# Patient Record
Sex: MALE | Race: BLACK OR AFRICAN AMERICAN | Employment: UNEMPLOYED | URBAN - METROPOLITAN AREA
[De-identification: names, ages, dates, MRNs, and addresses within clinical notes are randomized per-mention and may not be internally consistent; named-entity substitution may affect disease eponyms.]

---

## 2018-08-14 ENCOUNTER — APPOINTMENT (OUTPATIENT)
Dept: GENERAL RADIOLOGY | Facility: HOSPITAL | Age: 38
DRG: 638 | End: 2018-08-14
Attending: EMERGENCY MEDICINE
Payer: COMMERCIAL

## 2018-08-14 ENCOUNTER — HOSPITAL ENCOUNTER (INPATIENT)
Facility: HOSPITAL | Age: 38
LOS: 1 days | Discharge: HOME | DRG: 638 | End: 2018-08-15
Attending: EMERGENCY MEDICINE | Admitting: HOSPITALIST
Payer: COMMERCIAL

## 2018-08-14 DIAGNOSIS — E11.10 DIABETIC KETOACIDOSIS WITHOUT COMA ASSOCIATED WITH TYPE 2 DIABETES MELLITUS (HCC): Primary | ICD-10-CM

## 2018-08-14 LAB
ANION GAP SERPL CALC-SCNC: 20 MMOL/L (ref 0–18)
BACTERIA UR QL AUTO: NEGATIVE /HPF
BASOPHILS # BLD: 0 K/UL (ref 0–0.2)
BASOPHILS NFR BLD: 0 %
BILIRUB UR QL: NEGATIVE
BUN SERPL-MCNC: 18 MG/DL (ref 8–20)
BUN/CREAT SERPL: 13.5 (ref 10–20)
CALCIUM SERPL-MCNC: 9 MG/DL (ref 8.5–10.5)
CHLORIDE SERPL-SCNC: 89 MMOL/L (ref 95–110)
CLARITY UR: CLEAR
CO2 SERPL-SCNC: 14 MMOL/L (ref 22–32)
COLOR UR: COLORLESS
CREAT SERPL-MCNC: 1.33 MG/DL (ref 0.5–1.5)
EOSINOPHIL # BLD: 0.1 K/UL (ref 0–0.7)
EOSINOPHIL NFR BLD: 1 %
ERYTHROCYTE [DISTWIDTH] IN BLOOD BY AUTOMATED COUNT: 14.7 % (ref 11–15)
GLUCOSE BLDC GLUCOMTR-MCNC: 263 MG/DL (ref 70–99)
GLUCOSE BLDC GLUCOMTR-MCNC: 333 MG/DL (ref 70–99)
GLUCOSE BLDC GLUCOMTR-MCNC: 415 MG/DL (ref 70–99)
GLUCOSE BLDC GLUCOMTR-MCNC: >500 MG/DL (ref 70–99)
GLUCOSE SERPL-MCNC: 711 MG/DL (ref 70–99)
GLUCOSE UR-MCNC: >=500 MG/DL
HCT VFR BLD AUTO: 42.9 % (ref 41–52)
HGB BLD-MCNC: 14.3 G/DL (ref 13.5–17.5)
HGB UR QL STRIP.AUTO: NEGATIVE
KETONES UR-MCNC: 20 MG/DL
LEUKOCYTE ESTERASE UR QL STRIP.AUTO: NEGATIVE
LYMPHOCYTES # BLD: 1.1 K/UL (ref 1–4)
LYMPHOCYTES NFR BLD: 14 %
MCH RBC QN AUTO: 27.2 PG (ref 27–32)
MCHC RBC AUTO-ENTMCNC: 33.3 G/DL (ref 32–37)
MCV RBC AUTO: 81.7 FL (ref 80–100)
MONOCYTES # BLD: 0.7 K/UL (ref 0–1)
MONOCYTES NFR BLD: 9 %
NEUTROPHILS # BLD AUTO: 6.3 K/UL (ref 1.8–7.7)
NEUTROPHILS NFR BLD: 77 %
NITRITE UR QL STRIP.AUTO: NEGATIVE
OSMOLALITY UR CALC.SUM OF ELEC: 292 MOSM/KG (ref 275–295)
PH UR: 5 [PH] (ref 5–8)
PLATELET # BLD AUTO: 244 K/UL (ref 140–400)
PMV BLD AUTO: 9.3 FL (ref 7.4–10.3)
POTASSIUM SERPL-SCNC: 4.3 MMOL/L (ref 3.3–5.1)
PROT UR-MCNC: NEGATIVE MG/DL
RBC # BLD AUTO: 5.25 M/UL (ref 4.5–5.9)
RBC #/AREA URNS AUTO: 0 /HPF
SODIUM SERPL-SCNC: 123 MMOL/L (ref 136–144)
SP GR UR STRIP: 1.03 (ref 1–1.03)
TROPONIN I SERPL-MCNC: 0.01 NG/ML (ref ?–0.03)
UROBILINOGEN UR STRIP-ACNC: <2
VIT C UR-MCNC: NEGATIVE MG/DL
WBC # BLD AUTO: 8.3 K/UL (ref 4–11)
WBC #/AREA URNS AUTO: 2 /HPF

## 2018-08-14 PROCEDURE — 71045 X-RAY EXAM CHEST 1 VIEW: CPT | Performed by: EMERGENCY MEDICINE

## 2018-08-14 PROCEDURE — 99223 1ST HOSP IP/OBS HIGH 75: CPT | Performed by: HOSPITALIST

## 2018-08-14 NOTE — ED INITIAL ASSESSMENT (HPI)
Mid-sternum chest pain that started yesterday and has resolved. Pt also reports BS greater than 500 per pt. Pt states he is not a diabetic, but mom just wanted to check it because she has a hx of DM. Pt has no complaints at this time.

## 2018-08-15 VITALS
HEART RATE: 65 BPM | WEIGHT: 268.63 LBS | BODY MASS INDEX: 33.4 KG/M2 | OXYGEN SATURATION: 98 % | SYSTOLIC BLOOD PRESSURE: 145 MMHG | TEMPERATURE: 98 F | HEIGHT: 75 IN | DIASTOLIC BLOOD PRESSURE: 98 MMHG | RESPIRATION RATE: 18 BRPM

## 2018-08-15 LAB
ANION GAP SERPL CALC-SCNC: 10 MMOL/L (ref 0–18)
ANION GAP SERPL CALC-SCNC: 13 MMOL/L (ref 0–18)
BASOPHILS # BLD: 0 K/UL (ref 0–0.2)
BASOPHILS NFR BLD: 0 %
BUN SERPL-MCNC: 14 MG/DL (ref 8–20)
BUN SERPL-MCNC: 15 MG/DL (ref 8–20)
BUN/CREAT SERPL: 13.7 (ref 10–20)
BUN/CREAT SERPL: 14 (ref 10–20)
CALCIUM SERPL-MCNC: 8.4 MG/DL (ref 8.5–10.5)
CALCIUM SERPL-MCNC: 8.8 MG/DL (ref 8.5–10.5)
CHLORIDE SERPL-SCNC: 100 MMOL/L (ref 95–110)
CHLORIDE SERPL-SCNC: 100 MMOL/L (ref 95–110)
CO2 SERPL-SCNC: 21 MMOL/L (ref 22–32)
CO2 SERPL-SCNC: 24 MMOL/L (ref 22–32)
CREAT SERPL-MCNC: 1.02 MG/DL (ref 0.5–1.5)
CREAT SERPL-MCNC: 1.07 MG/DL (ref 0.5–1.5)
EOSINOPHIL # BLD: 0.1 K/UL (ref 0–0.7)
EOSINOPHIL NFR BLD: 2 %
ERYTHROCYTE [DISTWIDTH] IN BLOOD BY AUTOMATED COUNT: 14.2 % (ref 11–15)
GLUCOSE BLDC GLUCOMTR-MCNC: 210 MG/DL (ref 70–99)
GLUCOSE BLDC GLUCOMTR-MCNC: 237 MG/DL (ref 70–99)
GLUCOSE BLDC GLUCOMTR-MCNC: 240 MG/DL (ref 70–99)
GLUCOSE BLDC GLUCOMTR-MCNC: 243 MG/DL (ref 70–99)
GLUCOSE BLDC GLUCOMTR-MCNC: 265 MG/DL (ref 70–99)
GLUCOSE BLDC GLUCOMTR-MCNC: 287 MG/DL (ref 70–99)
GLUCOSE BLDC GLUCOMTR-MCNC: 346 MG/DL (ref 70–99)
GLUCOSE SERPL-MCNC: 207 MG/DL (ref 70–99)
GLUCOSE SERPL-MCNC: 352 MG/DL (ref 70–99)
HBA1C MFR BLD: >16.9 % (ref 4–6)
HCT VFR BLD AUTO: 39.9 % (ref 41–52)
HGB BLD-MCNC: 13.3 G/DL (ref 13.5–17.5)
LYMPHOCYTES # BLD: 1.3 K/UL (ref 1–4)
LYMPHOCYTES NFR BLD: 20 %
MCH RBC QN AUTO: 27 PG (ref 27–32)
MCHC RBC AUTO-ENTMCNC: 33.4 G/DL (ref 32–37)
MCV RBC AUTO: 80.8 FL (ref 80–100)
MONOCYTES # BLD: 0.7 K/UL (ref 0–1)
MONOCYTES NFR BLD: 10 %
MRSA DNA SPEC QL NAA+PROBE: NEGATIVE
NEUTROPHILS # BLD AUTO: 4.4 K/UL (ref 1.8–7.7)
NEUTROPHILS NFR BLD: 68 %
OSMOLALITY UR CALC.SUM OF ELEC: 285 MOSM/KG (ref 275–295)
OSMOLALITY UR CALC.SUM OF ELEC: 293 MOSM/KG (ref 275–295)
PLATELET # BLD AUTO: 213 K/UL (ref 140–400)
PMV BLD AUTO: 9.2 FL (ref 7.4–10.3)
POTASSIUM SERPL-SCNC: 3.1 MMOL/L (ref 3.3–5.1)
POTASSIUM SERPL-SCNC: 3.9 MMOL/L (ref 3.3–5.1)
RBC # BLD AUTO: 4.94 M/UL (ref 4.5–5.9)
SODIUM SERPL-SCNC: 134 MMOL/L (ref 136–144)
SODIUM SERPL-SCNC: 134 MMOL/L (ref 136–144)
WBC # BLD AUTO: 6.5 K/UL (ref 4–11)

## 2018-08-15 PROCEDURE — 99239 HOSP IP/OBS DSCHRG MGMT >30: CPT | Performed by: HOSPITALIST

## 2018-08-15 PROCEDURE — 99254 IP/OBS CNSLTJ NEW/EST MOD 60: CPT | Performed by: INTERNAL MEDICINE

## 2018-08-15 RX ORDER — HYDRALAZINE HYDROCHLORIDE 20 MG/ML
10 INJECTION INTRAMUSCULAR; INTRAVENOUS EVERY 4 HOURS PRN
Status: DISCONTINUED | OUTPATIENT
Start: 2018-08-15 | End: 2018-08-15

## 2018-08-15 RX ORDER — METFORMIN HYDROCHLORIDE 500 MG/1
500 TABLET, EXTENDED RELEASE ORAL 2 TIMES DAILY WITH MEALS
Qty: 60 TABLET | Refills: 2 | Status: SHIPPED | OUTPATIENT
Start: 2018-08-15 | End: 2020-10-13

## 2018-08-15 RX ORDER — GLIMEPIRIDE 4 MG/1
2 TABLET ORAL
Qty: 30 TABLET | Refills: 2 | Status: SHIPPED | OUTPATIENT
Start: 2018-08-15 | End: 2020-05-28

## 2018-08-15 RX ORDER — METOCLOPRAMIDE HYDROCHLORIDE 5 MG/ML
10 INJECTION INTRAMUSCULAR; INTRAVENOUS EVERY 6 HOURS PRN
Status: DISCONTINUED | OUTPATIENT
Start: 2018-08-15 | End: 2018-08-15

## 2018-08-15 RX ORDER — ACETAMINOPHEN 325 MG/1
650 TABLET ORAL EVERY 6 HOURS PRN
Status: DISCONTINUED | OUTPATIENT
Start: 2018-08-15 | End: 2018-08-15

## 2018-08-15 RX ORDER — PANTOPRAZOLE SODIUM 40 MG/1
40 TABLET, DELAYED RELEASE ORAL
Status: DISCONTINUED | OUTPATIENT
Start: 2018-08-15 | End: 2018-08-15

## 2018-08-15 RX ORDER — ENOXAPARIN SODIUM 100 MG/ML
40 INJECTION SUBCUTANEOUS DAILY
Status: DISCONTINUED | OUTPATIENT
Start: 2018-08-15 | End: 2018-08-15

## 2018-08-15 RX ORDER — 0.9 % SODIUM CHLORIDE 0.9 %
1000 INTRAVENOUS SOLUTION INTRAVENOUS ONCE
Status: COMPLETED | OUTPATIENT
Start: 2018-08-15 | End: 2018-08-15

## 2018-08-15 RX ORDER — DEXTROSE MONOHYDRATE 25 G/50ML
50 INJECTION, SOLUTION INTRAVENOUS AS NEEDED
Status: DISCONTINUED | OUTPATIENT
Start: 2018-08-15 | End: 2018-08-15

## 2018-08-15 RX ORDER — ONDANSETRON 2 MG/ML
4 INJECTION INTRAMUSCULAR; INTRAVENOUS EVERY 6 HOURS PRN
Status: DISCONTINUED | OUTPATIENT
Start: 2018-08-15 | End: 2018-08-15

## 2018-08-15 RX ORDER — SODIUM CHLORIDE AND POTASSIUM CHLORIDE .9; .15 G/100ML; G/100ML
SOLUTION INTRAVENOUS CONTINUOUS
Status: DISCONTINUED | OUTPATIENT
Start: 2018-08-15 | End: 2018-08-15

## 2018-08-15 NOTE — ED NOTES
Housekeeping contacted to provide patient w/ inpt bed for CCU hold in ER. Patient provided w/ additional water. He denies any complaints at this time. Resting comfortably in bed on monitor. Will continue to monitor.

## 2018-08-15 NOTE — H&P
[address] Patient Status:  Emergency    1980 MRN [id number]   Location [address] Attending Rob Hull MD   Hosp Day # 0 PCP None Pcp     Date:  20 chart    Physical Exam:  Temp:  [99 °F (37.2 °C)] 99 °F (37.2 °C)  Pulse:  [87-89] 88  Resp:  [19-26] 26  BP: (120-134)/(79-99) 120/99    General:  Alert and oriented. Diffuse skin problem:  None.   Eye:  Pupils are equal, round and reactive to light, extr to monitor closely. Uncontrolled diabetes  Considering new-onset nature of diabetes will require diabetic teaching, check A1c, advised follow-up with ophthalmology and podiatry after establishing primary care and endocrine follow-up.     Substernal chest

## 2018-08-15 NOTE — DISCHARGE SUMMARY
[facility]  Discharge Summary     Twin Figueroa  : 1980    Status: Inpatient  Day #: 0    Attending: Janes Mora MD  PCP: None Pcp     Date of Admission: 2018  Date of Discharge: 8/15/2018     Hospital Discharge Diagnoses glimepiride 4 MG Tabs  Commonly known as:  AMARYL      Take 0.5 tablets (2 mg total) by mouth every morning before breakfast.   Quantity:  30 tablet  Refills:  2     insulin detemir 100 UNIT/ML Sopn  Commonly known as:  LEVEMIR      Inject 30 Units into

## 2018-08-15 NOTE — CONSULTS
[facility]    Report of Consultation    Александр Cm Patient Status:  Inpatient    1980 MRN [id number]   Location [facility] 5SW/SE Attending Gonzalo Walker MD   Hosp Day # 0 PCP None Pcp     Date of Admission:   50 % injection 50 mL, 50 mL, Intravenous, PRN  •  Glucose-Vitamin C (DEX-4) 4-0.006 g chewable tab 4 tablet, 4 tablet, Oral, Q15 Min PRN  •  glucose (DEX4) oral liquid 15 g, 15 g, Oral, Q15 Min PRN  •  Insulin Aspart Pen (NOVOLOG) 100 UNIT/ML flexpen 1-7 U

## 2018-08-15 NOTE — ED NOTES
Pt assigned to 547. Report given to Cliff Baeza not received from pharmacy at time of admission and medication administration endorsed to [facility]. Pharmacy message sent to have medication sent to medical tube station. Glucose rechecked result: 243.

## 2018-08-15 NOTE — ED PROVIDER NOTES
Patient Seen in: [facility] AND [facility] Emergency Department    History   Patient presents with:  Chest Pain Angina (cardiovascular)  Hyperglycemia (metabolic)    Stated Complaint: Chest Pains/ BS over 500    HPI    44-year-old male without known significant lungs are clear to auscultation  Cardiovascular: regular rate and rhythm  Gastrointestinal:  abdomen is soft and non tender, no masses, bowel sounds normal  Neurological: Speech normal.  Moving extremities equally ×4. Skin: warm and dry, no rashes.   [place]cu 2012  ------------------------------------------------------------      [facility]     Admission disposition: 8/14/2018  8:08 PM       Lab and EKG results noted. Patient reports feeling well at present and is without complaints.   IV fluid bolus initiated after i

## 2018-08-15 NOTE — ED NOTES
Dr. Damion Ashley paged; call back received. Verbal order obtained to discontinue insulin drip. Verbal order for 20 Units of Levemir received and order placed. Awaiting pharm verification. Patient updated on POC. No further change in assessment.

## 2018-08-15 NOTE — DIABETES ED
[facility]   Diabetes Education Note      Erlinda Cobb Patient Status Inpatient   1980  MRN [id number]  Location  [facility] 5SW/SE  Attending Javier Boo MD  Hospital Days # 0  PCP  None Pcp    Reason for Visit: Newly RN    8/15/2018  2:09 PM

## 2018-08-15 NOTE — ED NOTES
INPT bed received from housekeeping and pt moved. He ambulated with steady gait and denied any weakness or difficulty getting up. Insulin drip continuing to infuse and VSS. Patient provided with PO fluids. Belongings gathered for pending admission.  He paul

## 2018-08-15 NOTE — PROGRESS NOTES
Pt discharged, sister provided transportation home. Discharge paperwork and prescriptions reviewed, pt verbalized understanding. All questions and concerns addressed. IV access discontinued.   Education provided on importance of establishing with PCP and

## 2018-08-15 NOTE — ED NOTES
Pt admission to be downgraded from ICU per Dr. Rosea Galeazzi. Dr. Dmitriy Monahan made aware and TL and CRN notified. IVF completed. VSS. Pt sleeping in bed at this time.

## 2018-08-16 ENCOUNTER — TELEPHONE (OUTPATIENT)
Dept: MEDSURG UNIT | Facility: HOSPITAL | Age: 38
End: 2018-08-16

## 2018-08-17 NOTE — PAYOR COMM NOTE
--------------  DISCHARGE REVIEW    Secondary Payor: N/A  Subscriber #:    Secondary Payor Authorization Number: N/A      Admit date: 8/15/18  Admit time:  4786  Discharge Date: 8/15/2018  5:14 PM     Admitting Physician: Kiara Hanna MD  Attending Alfred resp. rate 18, height 6' 3\" (1.905 m), weight 268 lb 9.6 oz (121.8 kg), SpO2 98 %.   General:  Alert, no distress  HEENT:  Normocephalic, atraumatic  Neck:  Supple, symmetrical  Cardiac:  Regular rate, regular rhythm  Pulmonary:  Clear to auscultation bila answered.       Marnie Gentile MD      Electronically signed by Bashir Finn MD on 8/15/2018  3:02 PM         REVIEWER COMMENTS--------------  ADMISSION REVIEW     Payor: [address] [place] [address] #:  [id number]  Authorization Number: [id number]    Briseyda Tyler Review of Systems    Positive for stated complaint: Chest Pains/ BS over 500  Other systems are as noted in HPI. Constitutional and vital signs reviewed. All other systems reviewed and negative except as noted above.     Physical Exam created for panel order CBC WITH DIFFERENTIAL WITH PLATELET.   Procedure                               Abnormality         Status                     ---------                               -----------         ------                     CBC W/ DIFFERENTIAL[ by Aileen Pierce MD on 8/14/2018  8:12 PM            H&P - H&P Note      H&P signed by Tara Smith MD at 8/15/2018  7:19 AM     Author:  Tara Smith MD Service:  (none) Author Type:  Physician    Filed:  8/15/2018  7:19 AM Date of Service:  8/ Negative. Ear/Nose/Mouth/Throat:  Negative. Respiratory:  Negative  Cardiovascular: Sternal chest discomfort  Gastrointestinal:  Negative. Genitourinary: Polyuria  Endocrine:  Negative. Immunologic:  Negative. Musculoskeletal:  Negative.   Garrison Vargas started on insulin drip and IV fluids, will continue to monitor with every 4 hourly BMP to evaluate for reduction in anion gap and improving acidosis. Endocrinology has been consulted, patient to be monitored in PCU.     Hyponatremia  Likely secondary to c

## 2018-08-29 ENCOUNTER — OFFICE VISIT (OUTPATIENT)
Dept: INTERNAL MEDICINE CLINIC | Facility: CLINIC | Age: 38
End: 2018-08-29
Payer: COMMERCIAL

## 2018-08-29 VITALS
HEART RATE: 67 BPM | BODY MASS INDEX: 35.93 KG/M2 | SYSTOLIC BLOOD PRESSURE: 120 MMHG | DIASTOLIC BLOOD PRESSURE: 78 MMHG | WEIGHT: 289 LBS | HEIGHT: 75 IN

## 2018-08-29 DIAGNOSIS — Z76.89 ENCOUNTER TO ESTABLISH CARE: ICD-10-CM

## 2018-08-29 DIAGNOSIS — E11.9 DM TYPE 2, GOAL HBA1C < 7% (HCC): Primary | ICD-10-CM

## 2018-08-29 PROCEDURE — 99204 OFFICE O/P NEW MOD 45 MIN: CPT | Performed by: INTERNAL MEDICINE

## 2018-08-29 RX ORDER — LANCETS 28 GAUGE
EACH MISCELLANEOUS
COMMUNITY
Start: 2018-08-16

## 2018-08-29 RX ORDER — PEN NEEDLE, DIABETIC 31 GX5/16"
NEEDLE, DISPOSABLE MISCELLANEOUS
COMMUNITY
Start: 2018-08-16

## 2018-08-29 RX ORDER — BLOOD-GLUCOSE METER
KIT MISCELLANEOUS
Refills: 0 | COMMUNITY
Start: 2018-08-17

## 2018-08-29 RX ORDER — BLOOD-GLUCOSE METER
KIT MISCELLANEOUS
Refills: 3 | COMMUNITY
Start: 2018-08-17

## 2018-08-29 NOTE — PROGRESS NOTES
HPI:    Patient ID: Twin Figueroa is a 40year old male. HPI he came here today to establish care with new physician. He was recently admitted to the hospital and diagnosed with diabetes.   Since he was discharged he did not check his blood sugar a skin nightly. Disp: 4 pen Rfl: 2   MetFORMIN HCl  MG Oral Tablet 24 Hr Take 1 tablet (500 mg total) by mouth 2 (two) times daily with meals.  Disp: 60 tablet Rfl: 2   glimepiride 4 MG Oral Tab Take 0.5 tablets (2 mg total) by mouth every morning befor round, and reactive to light. Right eye exhibits no discharge. Left eye exhibits no discharge. No scleral icterus. Neck: Normal range of motion. Neck supple. No JVD present. No tracheal tenderness present. No tracheal deviation present.  No thyroid mass a Kiana  Obesity advised him for diet and aerobic exercise 4 times a week for 45 minutes will check his lipid panel      Orders Placed This Encounter      Microalb/Creat Ratio, Random Urine [E]      Lipid Panel [E]    Meds This Visit:    No prescriptions req

## 2018-08-29 NOTE — PATIENT INSTRUCTIONS
Dm type 2, goal hba1c < 7% (hcc)  (primary encounter diagnosis) newly diagnosed advised him to watch his diet advised him to check blood sugar fasting in pre-meal and bring logbook on your next visit continue with current medication now I will check UA for

## 2018-09-12 ENCOUNTER — OFFICE VISIT (OUTPATIENT)
Dept: OPHTHALMOLOGY | Facility: CLINIC | Age: 38
End: 2018-09-12
Payer: COMMERCIAL

## 2018-09-12 DIAGNOSIS — E11.9 TYPE 2 DIABETES MELLITUS WITHOUT RETINOPATHY (HCC): Primary | ICD-10-CM

## 2018-09-12 DIAGNOSIS — H52.03 HYPERMETROPIA OF BOTH EYES: ICD-10-CM

## 2018-09-12 PROCEDURE — 99212 OFFICE O/P EST SF 10 MIN: CPT | Performed by: OPHTHALMOLOGY

## 2018-09-12 PROCEDURE — 99243 OFF/OP CNSLTJ NEW/EST LOW 30: CPT | Performed by: OPHTHALMOLOGY

## 2018-09-12 PROCEDURE — 92015 DETERMINE REFRACTIVE STATE: CPT | Performed by: OPHTHALMOLOGY

## 2018-09-12 NOTE — PROGRESS NOTES
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     HPI:     HPI     Diabetic Eye Exam      Additional comments: Pt has been a diabetic for 1 month  1 month on pills/ 1 month on Insulin   Pt checks his BS once a day   Pt's last blood sugar was 280 on 9/08/18  Las cc 20/30 20/30 +1    Dist ph cc 20/20 -2 20/20 -2    Near cc 20/25+ 20/20          Tonometry (Applanation, 2:53 PM)       Right Left    Pressure 14 15          Pupils       Pupils    Right PERRL    Left PERRL          Visual Fields       Left Right     Ful Discussed ocular and systemic benefits of blood sugar control. Diagnosis and treatment discussed in detail with patient. No orders of the defined types were placed in this encounter.       Meds This Visit:  Requested Prescriptions      No prescriptio

## 2018-09-12 NOTE — PATIENT INSTRUCTIONS
Hypermetropia of both eyes  Recommend +2.25 over the counter glasses for distance while his blood sugars are stabilizing. Will see patient back in 1 year, unless he needs to come back sooner for a refraction only when his blood sugars have stabilized.

## 2018-09-12 NOTE — ASSESSMENT & PLAN NOTE
Recommend +2.25 over the counter glasses for distance while his blood sugars are stabilizing. Will see patient back in 1 year, unless he needs to come back sooner for a refraction only when his blood sugars have stabilized.

## 2018-11-25 ENCOUNTER — APPOINTMENT (OUTPATIENT)
Dept: GENERAL RADIOLOGY | Facility: HOSPITAL | Age: 38
End: 2018-11-25
Attending: EMERGENCY MEDICINE
Payer: COMMERCIAL

## 2018-11-25 ENCOUNTER — HOSPITAL ENCOUNTER (EMERGENCY)
Facility: HOSPITAL | Age: 38
Discharge: HOME | End: 2018-11-25
Attending: EMERGENCY MEDICINE
Payer: COMMERCIAL

## 2018-11-25 VITALS
HEIGHT: 75 IN | OXYGEN SATURATION: 99 % | HEART RATE: 68 BPM | BODY MASS INDEX: 34.82 KG/M2 | WEIGHT: 280 LBS | DIASTOLIC BLOOD PRESSURE: 85 MMHG | RESPIRATION RATE: 18 BRPM | TEMPERATURE: 99 F | SYSTOLIC BLOOD PRESSURE: 136 MMHG

## 2018-11-25 DIAGNOSIS — L02.91 ABSCESS: Primary | ICD-10-CM

## 2018-11-25 DIAGNOSIS — R73.9 HYPERGLYCEMIA: ICD-10-CM

## 2018-11-25 PROCEDURE — 10061 I&D ABSCESS COMP/MULTIPLE: CPT

## 2018-11-25 PROCEDURE — 82962 GLUCOSE BLOOD TEST: CPT

## 2018-11-25 PROCEDURE — 96361 HYDRATE IV INFUSION ADD-ON: CPT

## 2018-11-25 PROCEDURE — 82805 BLOOD GASES W/O2 SATURATION: CPT | Performed by: EMERGENCY MEDICINE

## 2018-11-25 PROCEDURE — 96374 THER/PROPH/DIAG INJ IV PUSH: CPT

## 2018-11-25 PROCEDURE — 81001 URINALYSIS AUTO W/SCOPE: CPT | Performed by: EMERGENCY MEDICINE

## 2018-11-25 PROCEDURE — 80048 BASIC METABOLIC PNL TOTAL CA: CPT | Performed by: EMERGENCY MEDICINE

## 2018-11-25 PROCEDURE — 85025 COMPLETE CBC W/AUTO DIFF WBC: CPT | Performed by: EMERGENCY MEDICINE

## 2018-11-25 PROCEDURE — 99284 EMERGENCY DEPT VISIT MOD MDM: CPT

## 2018-11-25 RX ORDER — SODIUM CHLORIDE 9 MG/ML
INJECTION, SOLUTION INTRAVENOUS CONTINUOUS
Status: DISCONTINUED | OUTPATIENT
Start: 2018-11-25 | End: 2018-11-25

## 2018-11-25 RX ORDER — DOXYCYCLINE HYCLATE 100 MG/1
100 CAPSULE ORAL 2 TIMES DAILY
Qty: 20 CAPSULE | Refills: 0 | Status: SHIPPED | OUTPATIENT
Start: 2018-11-25 | End: 2018-11-29

## 2018-11-25 NOTE — ED INITIAL ASSESSMENT (HPI)
Right knee swelling, pain, and  \"Cloudy bloody drainage\" x 1 week after injuring it while at work.  Denies having fevers

## 2018-11-25 NOTE — ED PROVIDER NOTES
Patient Seen in: [facility] AND [facility] Emergency Department    History   Patient presents with:  Musculoskeletal Problem    Stated Complaint: R knee pain/swollen    HPI    29-year-old male with history of diabetes, on metformin and Levemir, noncompliant with reviewed and negative except as noted above.     Physical Exam     ED Triage Vitals [11/25/18 1358]   /85   Pulse 69   Resp 18   Temp 98.5 °F (36.9 °C)   Temp src Oral   SpO2 98 %   O2 Device None (Room air)       Current:/85   Pulse 68   Temp 9 contain hyperechoic debris. Under dynamic guidance, the scalpel was observed to enter the abscess. Loculations were disrupted and abscess cavity was packed. Ultrasound was interpreted by me.     Conclusion: Successful abscess localization and drainage und (L) 27.0 - 32.0 pg    MCHC 32.6 32.0 - 37.0 g/dl    RDW 14.1 11.0 - 15.0 %     140 - 400 K/UL    MPV 9.1 7.4 - 10.3 fL    Neutrophil % 83 %    Lymphocyte % 8 %    Monocyte % 7 %    Eosinophil % 1 %    Basophil % 1 %    Neutrophil Absolute 6.3 1.8 - evaluation.      44yoM with R knee pain  - I personally reviewed and interpreted all the ED vitals  - afebrile, hemodynamically stable  - I ordered and personally reviewed the labs and imaging and found no leukocytosis, no anemia, accucheck elevated, no aci MD Sergo Linton [facility] [phone] [id number]  [phone]    Schedule an appointment as soon as possible for a visit in 2 days  As needed        Medications Prescribed:  Current Discharge Medication List    START taking these medications    Doxycycline Hyclate

## 2018-11-29 ENCOUNTER — OFFICE VISIT (OUTPATIENT)
Dept: INTERNAL MEDICINE CLINIC | Facility: CLINIC | Age: 38
End: 2018-11-29
Payer: COMMERCIAL

## 2018-11-29 VITALS
RESPIRATION RATE: 18 BRPM | BODY MASS INDEX: 34.44 KG/M2 | HEART RATE: 78 BPM | DIASTOLIC BLOOD PRESSURE: 82 MMHG | SYSTOLIC BLOOD PRESSURE: 122 MMHG | TEMPERATURE: 99 F | HEIGHT: 75 IN | WEIGHT: 277 LBS

## 2018-11-29 DIAGNOSIS — Z79.4 TYPE 2 DIABETES MELLITUS WITH HYPEROSMOLARITY WITHOUT COMA, WITH LONG-TERM CURRENT USE OF INSULIN (HCC): Primary | ICD-10-CM

## 2018-11-29 DIAGNOSIS — E11.00 TYPE 2 DIABETES MELLITUS WITH HYPEROSMOLARITY WITHOUT COMA, WITH LONG-TERM CURRENT USE OF INSULIN (HCC): Primary | ICD-10-CM

## 2018-11-29 DIAGNOSIS — M00.9 INFECTION OF RIGHT KNEE (HCC): ICD-10-CM

## 2018-11-29 DIAGNOSIS — L02.91 ABSCESS: ICD-10-CM

## 2018-11-29 PROCEDURE — 99214 OFFICE O/P EST MOD 30 MIN: CPT | Performed by: INTERNAL MEDICINE

## 2018-11-29 RX ORDER — CLINDAMYCIN HYDROCHLORIDE 300 MG/1
300 CAPSULE ORAL 3 TIMES DAILY
Qty: 30 CAPSULE | Refills: 0 | Status: SHIPPED | OUTPATIENT
Start: 2018-11-29 | End: 2020-05-28

## 2018-11-29 NOTE — PATIENT INSTRUCTIONS
ype 2 diabetes mellitus with hyperosmolarity without coma, with long-term current use of insulin ([facility])  (primary encounter diagnosis)  Very poorly controlled explained to him the importance of checking his blood sugar fasting and pre-meal and bring logbook

## 2018-11-29 NOTE — PROGRESS NOTES
HPI:    Patient ID: Angela Garcia is a 41year old male. HPI he came in today for follow-up from emergency room.   According to him he fell at his work on his knee his left knee got swollen he went to emergency room on the 25th they did an incision bruise/bleed easily. Psychiatric/Behavioral: Negative for agitation, behavioral problems, confusion, hallucinations and sleep disturbance. The patient is not nervous/anxious.             Current Outpatient Medications:  Clindamycin HCl 300 MG Oral Cap Jimmy membrane and external ear normal. No drainage or tenderness. No mastoid tenderness. Nose: Nose normal. Right sinus exhibits no maxillary sinus tenderness and no frontal sinus tenderness.  Left sinus exhibits no maxillary sinus tenderness and no frontal si No cranial nerve deficit. He exhibits normal muscle tone. Coordination normal.   Skin: Skin is warm, dry and intact. No rash noted. No cyanosis or erythema. Nails show no clubbing. Psychiatric: He has a normal mood and affect.  His behavior is normal.   V

## 2018-11-30 ENCOUNTER — OFFICE VISIT (OUTPATIENT)
Dept: ORTHOPEDICS CLINIC | Facility: CLINIC | Age: 38
End: 2018-11-30
Payer: COMMERCIAL

## 2018-11-30 DIAGNOSIS — M70.51 BURSITIS OF RIGHT PATELLA: Primary | ICD-10-CM

## 2018-11-30 PROCEDURE — 99243 OFF/OP CNSLTJ NEW/EST LOW 30: CPT | Performed by: ORTHOPAEDIC SURGERY

## 2018-11-30 PROCEDURE — 99212 OFFICE O/P EST SF 10 MIN: CPT | Performed by: ORTHOPAEDIC SURGERY

## 2018-11-30 NOTE — PROGRESS NOTES
11/30/2018  Felicitas Walsh  11/21/1980  41year old   male  Harish Valdivia MD    HPI:   Patient presents with:  Swelling: Right knee - onset few weeks ago - no injury - has sometimes a piching pain on and off - has swelling all the time - was in E glimepiride 4 MG Oral Tab Take 0.5 tablets (2 mg total) by mouth every morning before breakfast. Disp: 30 tablet Rfl: 2      HISTORY:  Past Medical History:   Diagnosis Date   • Diabetes ([facility].)       History reviewed. No pertinent surgical history.    Fami an incision and drainage done by the emergency room personnel. The patient's packing of his wound was pulled today to decrease the risk of developing infection. The patient's right knee was dressed today. He has poorly controlled diabetes.   The patient

## 2019-04-24 NOTE — ED NOTES
----- Message from Audra Puente sent at 4/24/2019  1:39 PM CDT -----  Type: Needs Medical Advice    Who Called:  Wife/Lilia  Best Call Back Number: [phone]  Additional Information: Needs to know the name of the nerve doctor that you referred patient to two years ago. Please call to advise.      Pt presents for right knee pain and swelling. Pt states a few weeks ago he fell and landed on his right kneecap. Pt has had progressive swelling, pus and blood draining from the knee. Pt denies fevers.  Pt reports blood sugars have been >700, has not been o

## 2019-09-08 ENCOUNTER — HOSPITAL ENCOUNTER (EMERGENCY)
Facility: HOSPITAL | Age: 39
Discharge: HOME | End: 2019-09-09
Attending: EMERGENCY MEDICINE
Payer: COMMERCIAL

## 2019-09-08 DIAGNOSIS — L03.011 PARONYCHIA OF FINGER OF RIGHT HAND: ICD-10-CM

## 2019-09-08 DIAGNOSIS — R73.9 HYPERGLYCEMIA: Primary | ICD-10-CM

## 2019-09-08 LAB — GLUCOSE BLDC GLUCOMTR-MCNC: 531 MG/DL (ref 70–99)

## 2019-09-08 PROCEDURE — 10060 I&D ABSCESS SIMPLE/SINGLE: CPT

## 2019-09-08 PROCEDURE — 80048 BASIC METABOLIC PNL TOTAL CA: CPT | Performed by: EMERGENCY MEDICINE

## 2019-09-08 PROCEDURE — 99284 EMERGENCY DEPT VISIT MOD MDM: CPT

## 2019-09-08 PROCEDURE — 96361 HYDRATE IV INFUSION ADD-ON: CPT

## 2019-09-08 PROCEDURE — 82962 GLUCOSE BLOOD TEST: CPT

## 2019-09-08 PROCEDURE — 96374 THER/PROPH/DIAG INJ IV PUSH: CPT

## 2019-09-08 RX ORDER — CEFADROXIL 500 MG/1
500 CAPSULE ORAL 2 TIMES DAILY
Qty: 20 CAPSULE | Refills: 0 | Status: SHIPPED | OUTPATIENT
Start: 2019-09-08 | End: 2019-09-18

## 2019-09-08 RX ORDER — INSULIN ASPART 100 [IU]/ML
0.1 INJECTION, SOLUTION INTRAVENOUS; SUBCUTANEOUS ONCE
Status: COMPLETED | OUTPATIENT
Start: 2019-09-08 | End: 2019-09-08

## 2019-09-09 VITALS
HEART RATE: 91 BPM | RESPIRATION RATE: 18 BRPM | SYSTOLIC BLOOD PRESSURE: 160 MMHG | WEIGHT: 277 LBS | OXYGEN SATURATION: 98 % | TEMPERATURE: 98 F | DIASTOLIC BLOOD PRESSURE: 80 MMHG | HEIGHT: 75 IN | BODY MASS INDEX: 34.44 KG/M2

## 2019-09-09 LAB
ANION GAP SERPL CALC-SCNC: 8 MMOL/L (ref 0–18)
BUN BLD-MCNC: 21 MG/DL (ref 7–18)
BUN/CREAT SERPL: 15.2 (ref 10–20)
CALCIUM BLD-MCNC: 9.3 MG/DL (ref 8.5–10.1)
CHLORIDE SERPL-SCNC: 98 MMOL/L (ref 98–112)
CO2 SERPL-SCNC: 26 MMOL/L (ref 21–32)
CREAT BLD-MCNC: 1.38 MG/DL (ref 0.7–1.3)
GLUCOSE BLD-MCNC: 573 MG/DL (ref 70–99)
GLUCOSE BLDC GLUCOMTR-MCNC: 437 MG/DL (ref 70–99)
OSMOLALITY SERPL CALC.SUM OF ELEC: 303 MOSM/KG (ref 275–295)
POTASSIUM SERPL-SCNC: 3.9 MMOL/L (ref 3.5–5.1)
SODIUM SERPL-SCNC: 132 MMOL/L (ref 136–145)

## 2019-09-09 PROCEDURE — 82962 GLUCOSE BLOOD TEST: CPT

## 2019-09-09 RX ORDER — IBUPROFEN 600 MG/1
600 TABLET ORAL ONCE
Status: COMPLETED | OUTPATIENT
Start: 2019-09-09 | End: 2019-09-09

## 2019-09-09 NOTE — ED PROVIDER NOTES
Patient Seen in: [facility] AND [facility] Emergency Department    History   Patient presents with:  Abscess (integumentary)    Stated Complaint: abcess    HPI    Patient is a 60-year-old male who presents with right second finger swelling and pain was 1 week. Incision made at the medial proximal aspect of the fingernail with a 15 blade scalpel with moderate amount of purulent drainage. Finger dressed with dressing. Antibiotics prescribed and patient advised on warm compresses TID.      glucose abnormal. Pt st

## 2020-04-23 ENCOUNTER — TELEPHONE (OUTPATIENT)
Dept: INTERNAL MEDICINE CLINIC | Facility: CLINIC | Age: 40
End: 2020-04-23

## 2020-05-28 ENCOUNTER — VIRTUAL PHONE E/M (OUTPATIENT)
Dept: INTERNAL MEDICINE CLINIC | Facility: CLINIC | Age: 40
End: 2020-05-28
Payer: COMMERCIAL

## 2020-05-28 DIAGNOSIS — Z79.4 TYPE 2 DIABETES MELLITUS WITH HYPEROSMOLARITY WITHOUT COMA, WITH LONG-TERM CURRENT USE OF INSULIN (HCC): Primary | ICD-10-CM

## 2020-05-28 DIAGNOSIS — E11.00 TYPE 2 DIABETES MELLITUS WITH HYPEROSMOLARITY WITHOUT COMA, WITH LONG-TERM CURRENT USE OF INSULIN (HCC): Primary | ICD-10-CM

## 2020-05-28 PROCEDURE — 99213 OFFICE O/P EST LOW 20 MIN: CPT | Performed by: INTERNAL MEDICINE

## 2020-05-28 NOTE — PROGRESS NOTES
Virtual Telephone Check-In    Danieldiane Fernandez verbally consents to a Virtual/Telephone Check-In visit on 05/28/20. Patient has been referred to the [facility] website at [email or web address] to review the yearly Consent to Treat document.     Patient bert

## 2020-07-02 ENCOUNTER — TELEPHONE (OUTPATIENT)
Dept: INTERNAL MEDICINE CLINIC | Facility: CLINIC | Age: 40
End: 2020-07-02

## 2020-07-02 NOTE — TELEPHONE ENCOUNTER
Mother Jordy Lees not on TONYA calling for patient is concerned   States patient seems weak, losing weight, no appetite, increased urination for at least 2-3 weeks. Mother said she is going to his house now. Informed mother patient needs to call us.   Mother stat

## 2020-07-02 NOTE — TELEPHONE ENCOUNTER
Patient will need to be seen. I do not know what is going on if he has frequent urination he needs to be checked we need to check his blood sugar we need to check his hemoglobin A1c.   We can do tele-visit but most likely will need to do some blood work as

## 2020-07-02 NOTE — TELEPHONE ENCOUNTER
Attempted to call patient. Mailbox is full. No option to leave message. Called mother and left message with her to have patient call our office back.    Per mother, patient's sister is on her way to check on patient now and she will have patient call

## 2020-07-03 NOTE — TELEPHONE ENCOUNTER
Patient calling back. States he is feeling ok. Mother gets worried. Patient states only complaint sometimes right side hurts goes away with Advil.     Informed patient needs follow up appointment with Dr. Isidro Aguilera, will have to call back is not at h

## 2020-10-13 ENCOUNTER — OFFICE VISIT (OUTPATIENT)
Dept: INTERNAL MEDICINE CLINIC | Facility: CLINIC | Age: 40
End: 2020-10-13

## 2020-10-13 VITALS
HEIGHT: 74.25 IN | HEART RATE: 120 BPM | WEIGHT: 220.38 LBS | DIASTOLIC BLOOD PRESSURE: 85 MMHG | BODY MASS INDEX: 27.98 KG/M2 | SYSTOLIC BLOOD PRESSURE: 135 MMHG | OXYGEN SATURATION: 98 % | TEMPERATURE: 98 F

## 2020-10-13 DIAGNOSIS — I20.8 ANGINA AT REST (HCC): ICD-10-CM

## 2020-10-13 DIAGNOSIS — Z00.00 ANNUAL PHYSICAL EXAM: ICD-10-CM

## 2020-10-13 DIAGNOSIS — E11.00 TYPE 2 DIABETES MELLITUS WITH HYPEROSMOLARITY WITHOUT COMA, WITHOUT LONG-TERM CURRENT USE OF INSULIN (HCC): Primary | ICD-10-CM

## 2020-10-13 PROCEDURE — 3079F DIAST BP 80-89 MM HG: CPT | Performed by: INTERNAL MEDICINE

## 2020-10-13 PROCEDURE — 3075F SYST BP GE 130 - 139MM HG: CPT | Performed by: INTERNAL MEDICINE

## 2020-10-13 PROCEDURE — 3008F BODY MASS INDEX DOCD: CPT | Performed by: INTERNAL MEDICINE

## 2020-10-13 PROCEDURE — 93000 ELECTROCARDIOGRAM COMPLETE: CPT | Performed by: INTERNAL MEDICINE

## 2020-10-13 PROCEDURE — 99214 OFFICE O/P EST MOD 30 MIN: CPT | Performed by: INTERNAL MEDICINE

## 2020-10-13 RX ORDER — LISINOPRIL 5 MG/1
5 TABLET ORAL DAILY
Qty: 90 TABLET | Refills: 0 | Status: SHIPPED | OUTPATIENT
Start: 2020-10-13 | End: 2021-02-25

## 2020-10-13 NOTE — PATIENT INSTRUCTIONS
Diabetes type 2-poorly controlled advised to watch his diet avoid carbohydrates check blood sugar fasting glucose level 121 check globin A1c continue with metformin for now    Chest pain on and off/ positive risk factor/needs a stress test ordered.   I did

## 2020-10-13 NOTE — PROGRESS NOTES
HPI:    Patient ID: Ceferino Aaron is a 46year old male. HPI  He came in today for follow-up he has not been seen since 2018.   Patient has history of diabetes used to be on insulin and metformin but according the patient stopped taking the insulin Hematological: Negative for adenopathy. Does not bruise/bleed easily. Psychiatric/Behavioral: Negative for agitation, behavioral problems, confusion, hallucinations and sleep disturbance. The patient is not nervous/anxious.           Current Outpatient maxillary sinus tenderness and no frontal sinus tenderness. Left sinus exhibits no maxillary sinus tenderness and no frontal sinus tenderness.    Mouth/Throat: Uvula is midline, oropharynx is clear and moist and mucous membranes are normal. Mucous membranes fasting glucose level 121 check globin A1c continue with metformin for now    Chest pain on and off/ positive risk factor/EKG sinus tachycardia at 113 bpm T changes 31 needs a stress test ordered.   I did explain to him if any chest pain shortness of breath

## 2020-12-09 ENCOUNTER — TELEPHONE (OUTPATIENT)
Dept: INTERNAL MEDICINE CLINIC | Facility: CLINIC | Age: 40
End: 2020-12-09

## 2020-12-29 ENCOUNTER — TELEPHONE (OUTPATIENT)
Dept: INTERNAL MEDICINE CLINIC | Facility: CLINIC | Age: 40
End: 2020-12-29

## 2020-12-30 ENCOUNTER — OFFICE VISIT (OUTPATIENT)
Dept: INTERNAL MEDICINE CLINIC | Facility: CLINIC | Age: 40
End: 2020-12-30

## 2020-12-30 VITALS
BODY MASS INDEX: 26.41 KG/M2 | SYSTOLIC BLOOD PRESSURE: 126 MMHG | WEIGHT: 208 LBS | TEMPERATURE: 98 F | OXYGEN SATURATION: 98 % | DIASTOLIC BLOOD PRESSURE: 80 MMHG | RESPIRATION RATE: 14 BRPM | HEART RATE: 110 BPM | HEIGHT: 74.5 IN

## 2020-12-30 DIAGNOSIS — E11.00 TYPE 2 DIABETES MELLITUS WITH HYPEROSMOLARITY WITHOUT COMA, WITHOUT LONG-TERM CURRENT USE OF INSULIN (HCC): Primary | ICD-10-CM

## 2020-12-30 PROCEDURE — 3079F DIAST BP 80-89 MM HG: CPT | Performed by: INTERNAL MEDICINE

## 2020-12-30 PROCEDURE — 3074F SYST BP LT 130 MM HG: CPT | Performed by: INTERNAL MEDICINE

## 2020-12-30 PROCEDURE — 3008F BODY MASS INDEX DOCD: CPT | Performed by: INTERNAL MEDICINE

## 2020-12-30 PROCEDURE — 99213 OFFICE O/P EST LOW 20 MIN: CPT | Performed by: INTERNAL MEDICINE

## 2020-12-30 NOTE — PROGRESS NOTES
HPI:    Patient ID: Yodit Farrell is a 45year old male. HPI    He came in today for follow-up on his labs. His hemoglobin A1c is high because he stopped taking insulin. He is taking only Metformin 1000 twice a day.   He is trying to watch his die 4 pen 2   • metFORMIN HCl 1000 MG Oral Tab Take 0.5 tablets (500 mg total) by mouth 2 (two) times daily with meals.  (Patient taking differently: Take 1,000 mg by mouth 2 (two) times daily with meals.  ) 360 tablet 0   • Blood Glucose Monitoring Suppl (FREE reactive to light. Conjunctivae and EOM are normal. Right eye exhibits no discharge. Left eye exhibits no discharge. No scleral icterus. Neck: Normal range of motion. Neck supple. No JVD present. No tracheal tenderness present.  No tracheal deviation pres ophthalmology    No orders of the defined types were placed in this encounter.       Meds This Visit:  Requested Prescriptions     Signed Prescriptions Disp Refills   • insulin detemir 100 UNIT/ML Subcutaneous Solution Pen-injector 4 pen 2     Sig: Inject 7

## 2021-02-25 ENCOUNTER — OFFICE VISIT (OUTPATIENT)
Dept: INTERNAL MEDICINE CLINIC | Facility: CLINIC | Age: 41
End: 2021-02-25
Payer: COMMERCIAL

## 2021-02-25 VITALS
HEIGHT: 74.5 IN | DIASTOLIC BLOOD PRESSURE: 87 MMHG | SYSTOLIC BLOOD PRESSURE: 124 MMHG | WEIGHT: 215 LBS | BODY MASS INDEX: 27.3 KG/M2 | HEART RATE: 114 BPM

## 2021-02-25 DIAGNOSIS — R06.02 SOB (SHORTNESS OF BREATH): ICD-10-CM

## 2021-02-25 DIAGNOSIS — N52.8 OTHER MALE ERECTILE DYSFUNCTION: ICD-10-CM

## 2021-02-25 DIAGNOSIS — E11.00 TYPE 2 DIABETES MELLITUS WITH HYPEROSMOLARITY WITHOUT COMA, WITHOUT LONG-TERM CURRENT USE OF INSULIN (HCC): Primary | ICD-10-CM

## 2021-02-25 DIAGNOSIS — R53.83 OTHER FATIGUE: ICD-10-CM

## 2021-02-25 LAB — VIT B12 SERPL-MCNC: 466 PG/ML (ref 193–986)

## 2021-02-25 PROCEDURE — 3008F BODY MASS INDEX DOCD: CPT | Performed by: INTERNAL MEDICINE

## 2021-02-25 PROCEDURE — 36415 COLL VENOUS BLD VENIPUNCTURE: CPT | Performed by: INTERNAL MEDICINE

## 2021-02-25 PROCEDURE — 3079F DIAST BP 80-89 MM HG: CPT | Performed by: INTERNAL MEDICINE

## 2021-02-25 PROCEDURE — 99214 OFFICE O/P EST MOD 30 MIN: CPT | Performed by: INTERNAL MEDICINE

## 2021-02-25 PROCEDURE — 3074F SYST BP LT 130 MM HG: CPT | Performed by: INTERNAL MEDICINE

## 2021-02-25 RX ORDER — CANAGLIFLOZIN 100 MG/1
100 TABLET, FILM COATED ORAL
Qty: 90 TABLET | Refills: 0 | Status: SHIPPED | OUTPATIENT
Start: 2021-02-25 | End: 2022-02-25

## 2021-02-25 RX ORDER — LISINOPRIL 5 MG/1
5 TABLET ORAL DAILY
Qty: 90 TABLET | Refills: 0 | Status: SHIPPED | OUTPATIENT
Start: 2021-02-25

## 2021-02-25 NOTE — PROGRESS NOTES
HPI:    Patient ID: Marysol Leung is a 45year old male. HPI     Today complaining of fatigue. According to him he is feeling very tired this is ongoing for more than a month. He states that after walking for short distance he feels tired.   He ha Neurological: Negative for dizziness, tremors, speech difficulty, weakness, light-headedness, numbness and headaches. Hematological: Negative for adenopathy. Does not bruise/bleed easily.    Psychiatric/Behavioral: Negative for agitation, behavioral pro tenderness. Tympanic membrane is not erythematous. Left Ear: Tympanic membrane and external ear normal. No drainage or tenderness. No mastoid tenderness.    Nose: Nose normal. Right sinus exhibits no maxillary sinus tenderness and no frontal sinus tendern a normal mood and affect. His behavior is normal.   Vitals reviewed.            ASSESSMENT/PLAN:   Type 2 diabetes mellitus with hyperosmolarity without coma, without long-term current use of insulin ([facility])  (primary encounter diagnosis) advised him to incre

## 2021-03-02 LAB
TESTOSTERONE, FREE, S: 14.8 NG/DL
TESTOSTERONE, TOTAL, S: 643 NG/DL

## 2021-03-03 ENCOUNTER — HOSPITAL ENCOUNTER (OUTPATIENT)
Dept: CV DIAGNOSTICS | Facility: HOSPITAL | Age: 41
Discharge: HOME | End: 2021-03-03
Attending: INTERNAL MEDICINE
Payer: COMMERCIAL

## 2021-03-03 DIAGNOSIS — R06.02 SOB (SHORTNESS OF BREATH): ICD-10-CM

## 2021-03-03 PROCEDURE — 93306 TTE W/DOPPLER COMPLETE: CPT | Performed by: INTERNAL MEDICINE

## 2021-08-25 ENCOUNTER — TELEPHONE (OUTPATIENT)
Dept: INTERNAL MEDICINE CLINIC | Facility: CLINIC | Age: 41
End: 2021-08-25

## 2021-12-01 ENCOUNTER — TELEPHONE (OUTPATIENT)
Dept: INTERNAL MEDICINE CLINIC | Facility: CLINIC | Age: 41
End: 2021-12-01

## 2021-12-01 NOTE — TELEPHONE ENCOUNTER
Pt is due for physical, DM eye exam and A1c, message has been left for pt to remind him to call and schedule appt.

## 2023-02-09 ENCOUNTER — TELEPHONE (OUTPATIENT)
Dept: INTERNAL MEDICINE CLINIC | Facility: CLINIC | Age: 43
End: 2023-02-09

## 2023-02-09 NOTE — TELEPHONE ENCOUNTER
I have not seen him in the outpatient setting 2021.   He is overdue for follow-up annual checkup blood work diabetic eye exam.  If he has different PCP let us know, otherwise schedule emma

## 2024-04-05 ENCOUNTER — HOSPITAL ENCOUNTER (INPATIENT)
Facility: HOSPITAL | Age: 44
LOS: 2 days | Discharge: HOME | End: 2024-04-07
Attending: EMERGENCY MEDICINE | Admitting: INTERNAL MEDICINE
Payer: COMMERCIAL

## 2024-04-05 ENCOUNTER — APPOINTMENT (OUTPATIENT)
Dept: CT IMAGING | Facility: HOSPITAL | Age: 44
End: 2024-04-05
Attending: EMERGENCY MEDICINE
Payer: COMMERCIAL

## 2024-04-05 DIAGNOSIS — L03.811 CELLULITIS OF SCALP: Primary | ICD-10-CM

## 2024-04-05 PROBLEM — N17.9 ACUTE RENAL FAILURE: Status: ACTIVE | Noted: 2024-04-05

## 2024-04-05 PROBLEM — N17.9 ACUTE RENAL FAILURE (HCC): Status: ACTIVE | Noted: 2024-04-05

## 2024-04-05 LAB
ALBUMIN SERPL-MCNC: 4.8 G/DL (ref 3.2–4.8)
ALBUMIN/GLOB SERPL: 1.3 {RATIO} (ref 1–2)
ALP LIVER SERPL-CCNC: 132 U/L
ALT SERPL-CCNC: 19 U/L
ANION GAP SERPL CALC-SCNC: 11 MMOL/L (ref 0–18)
AST SERPL-CCNC: 21 U/L (ref ?–34)
BASOPHILS # BLD AUTO: 0.04 X10(3) UL (ref 0–0.2)
BASOPHILS NFR BLD AUTO: 0.4 %
BILIRUB SERPL-MCNC: 0.4 MG/DL (ref 0.3–1.2)
BUN BLD-MCNC: 15 MG/DL (ref 9–23)
BUN/CREAT SERPL: 10.4 (ref 10–20)
CALCIUM BLD-MCNC: 9.6 MG/DL (ref 8.7–10.4)
CHLORIDE SERPL-SCNC: 98 MMOL/L (ref 98–112)
CO2 SERPL-SCNC: 24 MMOL/L (ref 21–32)
CREAT BLD-MCNC: 1.44 MG/DL
DEPRECATED RDW RBC AUTO: 38 FL (ref 35.1–46.3)
EGFRCR SERPLBLD CKD-EPI 2021: 62 ML/MIN/1.73M2 (ref 60–?)
EOSINOPHIL # BLD AUTO: 0.14 X10(3) UL (ref 0–0.7)
EOSINOPHIL NFR BLD AUTO: 1.3 %
ERYTHROCYTE [DISTWIDTH] IN BLOOD BY AUTOMATED COUNT: 13.1 % (ref 11–15)
GLOBULIN PLAS-MCNC: 3.6 G/DL (ref 2.8–4.4)
GLUCOSE BLD-MCNC: 535 MG/DL (ref 70–99)
GLUCOSE BLDC GLUCOMTR-MCNC: 252 MG/DL (ref 70–99)
GLUCOSE BLDC GLUCOMTR-MCNC: 277 MG/DL (ref 70–99)
GLUCOSE BLDC GLUCOMTR-MCNC: 366 MG/DL (ref 70–99)
GLUCOSE BLDC GLUCOMTR-MCNC: 379 MG/DL (ref 70–99)
GLUCOSE BLDC GLUCOMTR-MCNC: 510 MG/DL (ref 70–99)
GLUCOSE BLDC GLUCOMTR-MCNC: 527 MG/DL (ref 70–99)
HCT VFR BLD AUTO: 43.1 %
HGB BLD-MCNC: 14.3 G/DL
IMM GRANULOCYTES # BLD AUTO: 0.04 X10(3) UL (ref 0–1)
IMM GRANULOCYTES NFR BLD: 0.4 %
LACTATE SERPL-SCNC: 1.2 MMOL/L (ref 0.5–2)
LYMPHOCYTES # BLD AUTO: 1.05 X10(3) UL (ref 1–4)
LYMPHOCYTES NFR BLD AUTO: 9.9 %
MCH RBC QN AUTO: 26.8 PG (ref 26–34)
MCHC RBC AUTO-ENTMCNC: 33.2 G/DL (ref 31–37)
MCV RBC AUTO: 80.9 FL
MONOCYTES # BLD AUTO: 0.99 X10(3) UL (ref 0.1–1)
MONOCYTES NFR BLD AUTO: 9.3 %
NEUTROPHILS # BLD AUTO: 8.34 X10 (3) UL (ref 1.5–7.7)
NEUTROPHILS # BLD AUTO: 8.34 X10(3) UL (ref 1.5–7.7)
NEUTROPHILS NFR BLD AUTO: 78.7 %
OSMOLALITY SERPL CALC.SUM OF ELEC: 301 MOSM/KG (ref 275–295)
PLATELET # BLD AUTO: 313 10(3)UL (ref 150–450)
POTASSIUM SERPL-SCNC: 4.8 MMOL/L (ref 3.5–5.1)
PROT SERPL-MCNC: 8.4 G/DL (ref 5.7–8.2)
RBC # BLD AUTO: 5.33 X10(6)UL
SODIUM SERPL-SCNC: 133 MMOL/L (ref 136–145)
WBC # BLD AUTO: 10.6 X10(3) UL (ref 4–11)

## 2024-04-05 PROCEDURE — 70470 CT HEAD/BRAIN W/O & W/DYE: CPT | Performed by: EMERGENCY MEDICINE

## 2024-04-05 RX ORDER — NICOTINE POLACRILEX 4 MG
15 LOZENGE BUCCAL
Status: DISCONTINUED | OUTPATIENT
Start: 2024-04-05 | End: 2024-04-07

## 2024-04-05 RX ORDER — NICOTINE POLACRILEX 4 MG
30 LOZENGE BUCCAL
Status: DISCONTINUED | OUTPATIENT
Start: 2024-04-05 | End: 2024-04-07

## 2024-04-05 RX ORDER — DEXTROSE MONOHYDRATE 25 G/50ML
50 INJECTION, SOLUTION INTRAVENOUS
Status: DISCONTINUED | OUTPATIENT
Start: 2024-04-05 | End: 2024-04-07

## 2024-04-05 RX ORDER — MORPHINE SULFATE 4 MG/ML
2 INJECTION, SOLUTION INTRAMUSCULAR; INTRAVENOUS EVERY 4 HOURS PRN
Status: DISCONTINUED | OUTPATIENT
Start: 2024-04-05 | End: 2024-04-07

## 2024-04-05 RX ORDER — SODIUM CHLORIDE 9 MG/ML
INJECTION, SOLUTION INTRAVENOUS CONTINUOUS
Status: DISCONTINUED | OUTPATIENT
Start: 2024-04-05 | End: 2024-04-07

## 2024-04-05 RX ORDER — INSULIN ASPART 100 [IU]/ML
0.15 INJECTION, SOLUTION INTRAVENOUS; SUBCUTANEOUS ONCE
Status: COMPLETED | OUTPATIENT
Start: 2024-04-05 | End: 2024-04-05

## 2024-04-05 RX ORDER — VANCOMYCIN 1.75 GRAM/500 ML IN 0.9 % SODIUM CHLORIDE INTRAVENOUS
15 EVERY 12 HOURS
Status: DISCONTINUED | OUTPATIENT
Start: 2024-04-06 | End: 2024-04-07

## 2024-04-05 RX ORDER — LISINOPRIL 5 MG/1
5 TABLET ORAL DAILY
Status: DISCONTINUED | OUTPATIENT
Start: 2024-04-05 | End: 2024-04-06

## 2024-04-05 RX ORDER — VANCOMYCIN 1.75 GRAM/500 ML IN 0.9 % SODIUM CHLORIDE INTRAVENOUS
15 ONCE
Status: COMPLETED | OUTPATIENT
Start: 2024-04-05 | End: 2024-04-05

## 2024-04-05 RX ORDER — HYDROCODONE BITARTRATE AND ACETAMINOPHEN 5; 325 MG/1; MG/1
1 TABLET ORAL EVERY 6 HOURS PRN
Status: DISCONTINUED | OUTPATIENT
Start: 2024-04-05 | End: 2024-04-07

## 2024-04-05 RX ORDER — LISINOPRIL 5 MG/1
5 TABLET ORAL DAILY
Status: DISCONTINUED | OUTPATIENT
Start: 2024-04-06 | End: 2024-04-05

## 2024-04-05 RX ORDER — INSULIN DEGLUDEC 100 U/ML
7 INJECTION, SOLUTION SUBCUTANEOUS NIGHTLY
Status: DISCONTINUED | OUTPATIENT
Start: 2024-04-05 | End: 2024-04-06

## 2024-04-05 NOTE — ED INITIAL ASSESSMENT (HPI)
Patient ambulatory to triage, patient was seen at [facility] for drainage of an abscess on the back of his head. Clinic unable to treat abscess due to size. Abscess is large and firm to touch on the posterior left side of patients head. Also a concern for patients elevated blood sugar in the clinic. Patient is a diabetic but is not on any medications. Stated he was feeling fine so he did not follow up with any care.

## 2024-04-05 NOTE — ED PROVIDER NOTES
Patient Seen in: [facility] Emergency Department    History     Chief Complaint   Patient presents with    Hyperglycemia    Abscess       HPI    43-year-old male with a history of diabetes who has been off metformin given side effects he experienced who presents to the emergency department with 3 days of lump to his occipital head.  He denies any fevers or chills or drainage.  He initially reports a pimple that he popped and since then has had worsening swelling.  He was referred to the emergency department by an outside clinic.    History reviewed.   Past Medical History:   Diagnosis Date    Diabetes (HCC)        History reviewed. History reviewed. No pertinent surgical history.      Medications :  (Not in a hospital admission)       Family History   Problem Relation Age of Onset    Diabetes Mother     Diabetes Sister     Glaucoma Neg     Macular degeneration Neg        Smoking Status:   Social History     Socioeconomic History    Marital status: Single   Tobacco Use    Smoking status: Never    Smokeless tobacco: Never   Substance and Sexual Activity    Alcohol use: No    Drug use: No       Constitutional and vital signs reviewed.      Social History and Family History elements reviewed from today, pertinent positives to the presenting problem noted.    Physical Exam     ED Triage Vitals [04/05/24 1404]   /88   Pulse 84   Resp 20   Temp 97.5 °F (36.4 °C)   Temp src Temporal   SpO2 100 %   O2 Device None (Room air)       All measures to prevent infection transmission during my interaction with the patient were taken. The patient was already wearing a droplet mask on my arrival to the room. Personal protective equipment was worn throughout the duration of the exam.  Handwashing was performed prior to and after the exam.  Stethoscope and any equipment used during my examination was cleaned with super sani-cloth germicidal wipes following the exam.     Physical Exam    General: NAD  Head: Left posterior  occipital induration without focal area of fluctuance approx least 5 cm in diameter.  There is associated mild tenderness, no erythema.  No crepitus or bullae or expressible discharge  Mouth/Throat/Ears/Nose: Oropharynx is clear   Eyes: Conjunctivae and EOM are normal.   Neck: Normal range of motion. Supple.  No midline cervical tenderness.  Cardiovascular: Normal rate, regular rhythm, normal heart sounds.  Respiratory/Chest: Clear and equal bilaterally. Exhibits no tenderness.  Gastrointestinal: Soft, non-tender, non-distended. Bowel sounds are normal.   Musculoskeletal:No swelling or deformity.   Neurological: Alert and appropriate. No focal deficits.   Skin: Skin is warm and dry. No pallor.  Psychiatric: Has a normal mood and affect.      ED Course        Labs Reviewed   COMP METABOLIC PANEL (14) - Abnormal; Notable for the following components:       Result Value    Glucose 535 (*)     Sodium 133 (*)     Creatinine 1.44 (*)     Calculated Osmolality 301 (*)     Alkaline Phosphatase 132 (*)     Total Protein 8.4 (*)     All other components within normal limits   POCT GLUCOSE - Abnormal; Notable for the following components:    POC Glucose  527 (*)     All other components within normal limits   POCT GLUCOSE - Abnormal; Notable for the following components:    POC Glucose  510 (*)     All other components within normal limits   POCT GLUCOSE - Abnormal; Notable for the following components:    POC Glucose  379 (*)     All other components within normal limits   POCT GLUCOSE - Abnormal; Notable for the following components:    POC Glucose  366 (*)     All other components within normal limits   POCT GLUCOSE - Abnormal; Notable for the following components:    POC Glucose  252 (*)     All other components within normal limits   CBC W/ DIFFERENTIAL - Abnormal; Notable for the following components:    Neutrophil Absolute Prelim 8.34 (*)     Neutrophil Absolute 8.34 (*)     All other components within normal limits    CBC WITH DIFFERENTIAL WITH PLATELET    Narrative:     The following orders were created for panel order CBC With Differential With Platelet.                  Procedure                               Abnormality         Status                                     ---------                               -----------         ------                                     CBC W/ DIFFERENTIAL[[id number]]          Abnormal            Final result                                                 Please view results for these tests on the individual orders.   LACTIC ACID, PLASMA   RAINBOW DRAW LAVENDER   RAINBOW DRAW LIGHT GREEN   RAINBOW DRAW BLUE   BLOOD CULTURE   BLOOD CULTURE       As Interpreted by me    Imaging Results Available and Reviewed while in ED: CT BRAIN (W+WO) ([id number]=[id number])    Result Date: 4/5/2024  CONCLUSION:  1. Extensive subcutaneous infiltration in the left suboccipital region with associated skin thickening.  Infiltration extends to the left posterior paraspinous musculature.  No associated well-defined/drainable rim enhancing collection to suggest abscess.  Consider cellulitis or other soft tissue infection in the appropriate clinical setting. 2. No acute intracranial process by noncontrast and contrast enhanced CT technique.   Dictated by (CST): Dakota Dominique MD on 4/05/2024 at 6:08 PM     Finalized by (CST): Dakota Dominique MD on 4/05/2024 at 6:13 PM         ED Medications Administered:   Medications   glucose (Dex4) 15 GM/59ML oral liquid 15 g (has no administration in time range)     Or   glucose (Glutose) 40% oral gel 15 g (has no administration in time range)     Or   glucose-vitamin C (Dex-4) chewable tab 4 tablet (has no administration in time range)     Or   dextrose 50% injection 50 mL (has no administration in time range)     Or   glucose (Dex4) 15 GM/59ML oral liquid 30 g (has no administration in time range)     Or   glucose (Glutose) 40% oral gel 30 g (has no administration in time range)      Or   glucose-vitamin C (Dex-4) chewable tab 8 tablet (has no administration in time range)   vancomycin (Vancocin) 1.75 g in sodium chloride 0.9% 500mL IVPB premix (has no administration in time range)   cefTRIAXone (Rocephin) 2 g in D5W 100 mL IVPB-ADD (2 g Intravenous New Bag 4/5/24 1913)   sodium chloride 0.9 % IV bolus 1,000 mL (0 mL Intravenous Stopped 4/5/24 1512)   sodium chloride 0.9 % IV bolus 1,000 mL (0 mL Intravenous Stopped 4/5/24 1632)   insulin aspart (NovoLOG) 100 Units/mL vial 17 Units (17 Units Subcutaneous Given 4/5/24 1632)   iopamidol 76% (ISOVUE-370) injection for power injector (80 mL Intravenous Given 4/5/24 1748)         MDM     Vitals:    04/05/24 1622 04/05/24 1700 04/05/24 1800 04/05/24 1900   BP: (!) 164/97 (!) 165/109 (!) 159/112 (!) 142/105   Pulse: 96 95 99 96   Resp: 18 16 16 16   Temp:       TempSrc:       SpO2: 97% 96% 97% 97%   Weight:       Height:         *I personally reviewed and interpreted all ED vitals.    Pulse Ox: 97%, Room air, Normal     Monitor Interpretation:   normal sinus rhythm as interpreted by me.  The cardiac monitor was ordered given hyperglycemia.      Medical Decision Making      Differential Diagnosis/ Diagnostic Considerations: Cellulitis, abscess, DKA, hyperglycemia    Complicating Factors: The patient already has does not have any pertinent problems on file. to contribute to the complexity of this ED evaluation.    I reviewed prior chart records including  office  visit note from December 12, 2023.  Patient is here with likely cellulitis in the left Occipital scalp region.  Quite extensive induration and will obtain CT scan to assess for deep space abscess.  Patient also with glucose in 500s, no clinical or laboratory work to suggest DKA on my interpretation of the lab work.  Will provide him a dose of subcutaneous insulin.      CT scan obtained and demonstrates significant extensive cellulitis extending to the posterior paraspinal muscle for which the  patient is admitted.  I agree with the radiology report.  Antibiotics ordered.  Admitted to and discussed with .  Consultation message sent to .     Admitted in stable condition.  Patient is comfortable with the plan.    I spent 30 minutes of critical care time caring for this patient. This does not include time spent on separately reported billable procedures.       Disposition and Plan     Clinical Impression:  1. Cellulitis of scalp        Disposition:  Admit    Follow-up:  No follow-up provider specified.    Medications Prescribed:  Current Discharge Medication List          Hospital Problems       Present on Admission  Date Reviewed: 2/25/2021            ICD-10-CM Noted POA    Cellulitis of scalp L03.811 4/5/2024 Unknown

## 2024-04-05 NOTE — ED QUICK NOTES
Orders for admission, patient is aware of plan and ready to go upstairs. Any questions, please call ED EUGENIA johnson at extension [phone].     Patient Covid vaccination status: Unvaccinated     COVID Test Ordered in ED: None    COVID Suspicion at Admission: N/A    Running Infusions:  None    Mental Status/LOC at time of transport: ax4    Other pertinent information:   CIWA score: N/A   NIH score:  N/A

## 2024-04-06 LAB
ANION GAP SERPL CALC-SCNC: 7 MMOL/L (ref 0–18)
BUN BLD-MCNC: 10 MG/DL (ref 9–23)
BUN/CREAT SERPL: 9.6 (ref 10–20)
CALCIUM BLD-MCNC: 8.7 MG/DL (ref 8.7–10.4)
CHLORIDE SERPL-SCNC: 104 MMOL/L (ref 98–112)
CO2 SERPL-SCNC: 24 MMOL/L (ref 21–32)
CREAT BLD-MCNC: 1.04 MG/DL
EGFRCR SERPLBLD CKD-EPI 2021: 92 ML/MIN/1.73M2 (ref 60–?)
GLUCOSE BLD-MCNC: 320 MG/DL (ref 70–99)
GLUCOSE BLDC GLUCOMTR-MCNC: 249 MG/DL (ref 70–99)
GLUCOSE BLDC GLUCOMTR-MCNC: 271 MG/DL (ref 70–99)
GLUCOSE BLDC GLUCOMTR-MCNC: 283 MG/DL (ref 70–99)
GLUCOSE BLDC GLUCOMTR-MCNC: 286 MG/DL (ref 70–99)
OSMOLALITY SERPL CALC.SUM OF ELEC: 291 MOSM/KG (ref 275–295)
POTASSIUM SERPL-SCNC: 4.6 MMOL/L (ref 3.5–5.1)
SODIUM SERPL-SCNC: 135 MMOL/L (ref 136–145)

## 2024-04-06 PROCEDURE — 99222 1ST HOSP IP/OBS MODERATE 55: CPT | Performed by: INTERNAL MEDICINE

## 2024-04-06 RX ORDER — INSULIN DEGLUDEC 100 U/ML
10 INJECTION, SOLUTION SUBCUTANEOUS NIGHTLY
Status: DISCONTINUED | OUTPATIENT
Start: 2024-04-06 | End: 2024-04-07

## 2024-04-06 RX ORDER — HYDRALAZINE HYDROCHLORIDE 20 MG/ML
10 INJECTION INTRAMUSCULAR; INTRAVENOUS EVERY 6 HOURS PRN
Status: DISCONTINUED | OUTPATIENT
Start: 2024-04-06 | End: 2024-04-07

## 2024-04-06 RX ORDER — LISINOPRIL 10 MG/1
10 TABLET ORAL DAILY
Status: DISCONTINUED | OUTPATIENT
Start: 2024-04-07 | End: 2024-04-07

## 2024-04-06 NOTE — H&P
[facility]  part of [facility]    History and Physical    Chandler Lancaster Patient Status:  Inpatient    1980 MRN [id number]   Location [facility] 1W Attending Ortiz Hebert MD   Hosp Day # 1 PCP PRATEEK HEBERT MD     Date:  2024  Date of Admission:  2024    History provided by:patient  HPI:     Chief Complaint   Patient presents with    Hyperglycemia    Abscess     43-year-old male with a history of diabetes non compliant with treatment  presented  to the emergency department with 3 days of lump t and pain of his occipital head, says there was like a pimple initially which he popped.  He denies any fevers or chills .  In ER ct done no abscess but cellulitis of scalp going all the way down left posterior paraspinous musculature. Pt also was founf to have elevated glucose in to the 500 in ER.         History     Past Medical History:   Diagnosis Date    Diabetes (HCC)      History reviewed. No pertinent surgical history.  Family History   Problem Relation Age of Onset    Diabetes Mother     Diabetes Sister     Glaucoma Neg     Macular degeneration Neg      Social History:  Social History     Socioeconomic History    Marital status: Single   Tobacco Use    Smoking status: Never    Smokeless tobacco: Never   Substance and Sexual Activity    Alcohol use: No    Drug use: No     Social Determinants of Health     Food Insecurity: No Food Insecurity (2024)    Food Insecurity     Food Insecurity: Never true   Transportation Needs: No Transportation Needs (2024)    Transportation Needs     Lack of Transportation: No   Housing Stability: Low Risk  (2024)    Housing Stability     Housing Instability: No     Allergies/Medications:   Allergies: No Known Allergies  Medications Prior to Admission   Medication Sig    metFORMIN HCl 1000 MG Oral Tab Take 0.5 tablets (500 mg total) by mouth 2 (two) times daily with meals.    lisinopril 5 MG Oral Tab Take 1 tablet (5 mg total)  by mouth daily.    insulin detemir 100 UNIT/ML Subcutaneous Solution Pen-injector Inject 7 Units into the skin nightly.    Blood Glucose Monitoring Suppl (FREESTYLE LITE) Does not apply Device FPD    Glucose Blood (FREESTYLE LITE TEST) In Vitro Strip U UTD BID    FREESTYLE LANCETS Does not apply Misc     BD PEN NEEDLE SHORT U/F 31G X 8 MM Does not apply Misc        Review of Systems:     Constitutional: Negative.    HENT: Negative.          Scalp pain    Eyes: Negative.    Respiratory: Negative.     Cardiovascular: Negative.    Gastrointestinal: Negative.    Endocrine: Negative.    Genitourinary: Negative.    Skin: Negative.    Allergic/Immunologic: Negative.    Neurological: Negative.    Hematological: Negative.    Psychiatric/Behavioral: Negative.         Physical Exam:   Vital Signs:  Blood pressure (!) 144/100, pulse 99, temperature 98.3 °F (36.8 °C), temperature source Oral, resp. rate 18, height 6' 3\" (1.905 m), weight 242 lb 4.8 oz (109.9 kg), SpO2 97%.  Physical Exam  Vitals and nursing note reviewed.   HENT:      Head: Normocephalic.   Cardiovascular:      Rate and Rhythm: Normal rate.   Pulmonary:      Effort: Pulmonary effort is normal.      Breath sounds: Normal breath sounds.   Abdominal:      General: Abdomen is flat.      Palpations: Abdomen is soft.   Musculoskeletal:         General: Normal range of motion.      Cervical back: Normal range of motion.   Skin:     Comments: Scalp swelling    Neurological:      Mental Status: He is alert and oriented to person, place, and time.   Psychiatric:         Mood and Affect: Mood normal.           Results:     Lab Results   Component Value Date    WBC 10.6 04/05/2024    HGB 14.3 04/05/2024    HCT 43.1 04/05/2024    .0 04/05/2024    CREATSERUM 1.44 (H) 04/05/2024    BUN 15 04/05/2024     (L) 04/05/2024    K 4.8 04/05/2024    CL 98 04/05/2024    CO2 24.0 04/05/2024     (HH) 04/05/2024    CA 9.6 04/05/2024    ALB 4.8 04/05/2024    ALKPHO 132  (H) 04/05/2024    BILT 0.4 04/05/2024    TP 8.4 (H) 04/05/2024    AST 21 04/05/2024    ALT 19 04/05/2024    TROP 0.01 08/14/2018    B12 466 02/25/2021     CT BRAIN (W+WO) (LVQ=[id number])    Result Date: 4/5/2024  CONCLUSION:  1. Extensive subcutaneous infiltration in the left suboccipital region with associated skin thickening.  Infiltration extends to the left posterior paraspinous musculature.  No associated well-defined/drainable rim enhancing collection to suggest abscess.  Consider cellulitis or other soft tissue infection in the appropriate clinical setting. 2. No acute intracranial process by noncontrast and contrast enhanced CT technique.   Dictated by (CST): Dakota Dominique MD on 4/05/2024 at 6:08 PM     Finalized by (CST): Dakota Dominique MD on 4/05/2024 at 6:13 PM               Assessment/Plan:     Cellulitis of scalp  On IV ab, id on board will follow      Type 2 diabetes mellitus without retinopathy (HCC)  BS elevated, will treat with IVF and insuline , numbers have come down but still in the 200's will adjust the insuline coverage       Acute renal failure (HCC)  IVF, will follow     Dc in 2-3 days if he improves             Ortiz Hebert MD  4/6/2024

## 2024-04-06 NOTE — CONSULTS
[facility]  part of [facility] ID CONSULT NOTE    Chandler Lancaster Patient Status:  Inpatient    1980 MRN [id number]   Location [facility] 1W Attending Ortiz Hebert MD   Hosp Day # 1 PCP PRATEEK HEBERT MD     Reason for Consultation:  Scalp cellulitis    ASSESSMENT:    Antibiotics: Vancomycin, CTX    # Occipital scalp cellulitis  # Uncontrolled DM    PLAN:    -  Continue vancomycin + CTX  -  FU bcx  -  Monitor exam for improvement.  -  BS control.  -  Follow fever curve, wbc  -  Reviewed labs, micro, imaging reports, available old records  -  D/w patient, RN    History of Present Illness:  Chandler Lancaster is a a(n) 43 year old male with hx of DM. Pt presents to ER 4/5 with occipital head lump. Initially pt reports lesion started as a pimple that he popped and has gotten progressively worse. No fevers, chills or drainage. On admit, no fever. Wbc 10.6. LA 1.2. . CTH with extensive L suboccipital subcutaneous infiltration with skin thickening extending to posterior paraspinous musculature. No abscess. Blood cx sent.Pt on vancomycin + CTX. ID consulted.     History:  Past Medical History:   Diagnosis Date    Diabetes (HCC)      History reviewed. No pertinent surgical history.  Family History   Problem Relation Age of Onset    Diabetes Mother     Diabetes Sister     Glaucoma Neg     Macular degeneration Neg       reports that he has never smoked. He has never used smokeless tobacco. He reports that he does not drink alcohol and does not use drugs.    Allergies:  No Known Allergies    Medications:    Current Facility-Administered Medications:     hydrALAzine (Apresoline) 20 mg/mL injection 10 mg, 10 mg, Intravenous, Q6H PRN    glucose (Dex4) 15 GM/59ML oral liquid 15 g, 15 g, Oral, Q15 Min PRN **OR** glucose (Glutose) 40% oral gel 15 g, 15 g, Oral, Q15 Min PRN **OR** glucose-vitamin C (Dex-4) chewable tab 4 tablet, 4 tablet, Oral, Q15 Min PRN **OR** dextrose  50% injection 50 mL, 50 mL, Intravenous, Q15 Min PRN **OR** glucose (Dex4) 15 GM/59ML oral liquid 30 g, 30 g, Oral, Q15 Min PRN **OR** glucose (Glutose) 40% oral gel 30 g, 30 g, Oral, Q15 Min PRN **OR** glucose-vitamin C (Dex-4) chewable tab 8 tablet, 8 tablet, Oral, Q15 Min PRN    cefTRIAXone (Rocephin) 2 g in D5W 100 mL IVPB-ADD, 2 g, Intravenous, Q24H    vancomycin (Vancocin) 1.75 g in sodium chloride 0.9% 500mL IVPB premix, 15 mg/kg, Intravenous, q12h    sodium chloride 0.9% infusion, , Intravenous, Continuous    morphINE PF 4 MG/ML injection 2 mg, 2 mg, Intravenous, Q4H PRN    HYDROcodone-acetaminophen (Norco) 5-325 MG per tab 1 tablet, 1 tablet, Oral, Q6H PRN    insulin degludec 100 units/mL flextouch 7 Units, 7 Units, Subcutaneous, Nightly    insulin aspart (NovoLOG) 100 Units/mL FlexPen 1-7 Units, 1-7 Units, Subcutaneous, TID CC    lisinopril (Prinivil; Zestril) tab 5 mg, 5 mg, Oral, Daily    Review of Systems:  CONSTITUTIONAL:  No weight loss, weakness or fatigue.  HEENT:  Eyes:  No visual loss, blurred vision, double vision or yellow sclerae. Ears, Nose, Throat:  No hearing loss, sneezing, congestion, runny nose or sore throat.  SKIN:  +scalp swelling + pain  CARDIOVASCULAR:  No chest pain, chest pressure or chest discomfort  RESPIRATORY:  No shortness of breath, cough or sputum.  GASTROINTESTINAL:  No anorexia, nausea, vomiting or diarrhea. No abdominal pain or blood.  GENITOURINARY:  No Burning on urination.   NEUROLOGICAL:  No headache, dizziness, syncope, paralysis, ataxia, numbness or tingling in the extremities.  MUSCULOSKELETAL:  No muscle, back pain, joint pain or stiffness.    Physical Exam:  Vital signs: Blood pressure (!) 156/100, pulse 99, temperature 98 °F (36.7 °C), temperature source Oral, resp. rate 18, height 6' 3\" (1.905 m), weight 242 lb 4.8 oz (109.9 kg), SpO2 98%.    General: Alert, oriented, NAD  HEENT: Moist mucous membranes. EOMI, occipital area of swelling- indurated, non  erythematous or warm, no drainage or ttp  Neck: No lymphadenopathy.  Supple.  Cardiovascular: No chest wall tenderness  Respiratory: Symmetric expansion  Abdomen: Soft, nontender, nondistended.   Musculoskeletal: No edema noted  Integument: No lesions. No erythema.    Laboratory Data: Reviewed in EMR    Microbiology: Reviewed in EMR    Radiology: Reviewed    Thank you for allowing us to participate in the care of this patient. Please do not hesitate to call if you have any questions.     We will continue to follow with you and will make further recommendations based on his progress.    JUAN ANTONIO [facility]  [phone]  4/6/2024

## 2024-04-06 NOTE — PLAN OF CARE
Pt BP elevated, message sent via Perfect serve to Dr Marisol Padron regarding PRN BP med, Waiting resposnse

## 2024-04-06 NOTE — ED QUICK NOTES
Patient to bathroom. When back from bathroom iv no longer working. RN replaced iv for next antibiotic

## 2024-04-06 NOTE — PLAN OF CARE
Problem: Diabetes/Glucose Control  Goal: Glucose maintained within prescribed range  Description: INTERVENTIONS:  - Monitor Blood Glucose as ordered  - Assess for signs and symptoms of hyperglycemia and hypoglycemia  - Administer ordered medications to maintain glucose within target range  - Assess barriers to adequate nutritional intake and initiate nutrition consult as needed  - Instruct patient on self management of diabetes  Outcome: Progressing     Problem: Patient Centered Care  Goal: Patient preferences are identified and integrated in the patient's plan of care  Description: Interventions:  - What would you like us to know as we care for you? Pt from home alone  - Provide timely, complete, and accurate information to patient/family  - Incorporate patient and family knowledge, values, beliefs, and cultural backgrounds into the planning and delivery of care  - Encourage patient/family to participate in care and decision-making at the level they choose  - Honor patient and family perspectives and choices  Outcome: Progressing     Problem: Patient/Family Goals  Goal: Patient/Family Long Term Goal  Description: Patient's Long Term Goal: Manage pt diabetes    Interventions:    - See additional Care Plan goals for specific interventions  Outcome: Progressing  Goal: Patient/Family Short Term Goal  Description: Patient's Short Term Goal: Home    Interventions:     - See additional Care Plan goals for specific interventions  Outcome: Progressing     Problem: PAIN - ADULT  Goal: Verbalizes/displays adequate comfort level or patient's stated pain goal  Description: INTERVENTIONS:  - Encourage pt to monitor pain and request assistance  - Assess pain using appropriate pain scale  - Administer analgesics based on type and severity of pain and evaluate response  - Implement non-pharmacological measures as appropriate and evaluate response  - Consider cultural and social influences on pain and pain management  -  Manage/alleviate anxiety  - Utilize distraction and/or relaxation techniques  - Monitor for opioid side effects  - Notify MD/LIP if interventions unsuccessful or patient reports new pain  - Anticipate increased pain with activity and pre-medicate as appropriate  Outcome: Progressing     Problem: SAFETY ADULT - FALL  Goal: Free from fall injury  Description: INTERVENTIONS:  - Assess pt frequently for physical needs  - Identify cognitive and physical deficits and behaviors that affect risk of falls.  - [place] fall precautions as indicated by assessment.  - Educate pt/family on patient safety including physical limitations  - Instruct pt to call for assistance with activity based on assessment  - Modify environment to reduce risk of injury  - Provide assistive devices as appropriate  - Consider OT/PT consult to assist with strengthening/mobility  - Encourage toileting schedule  Outcome: Progressing     Problem: DISCHARGE PLANNING  Goal: Discharge to home or other facility with appropriate resources  Description: INTERVENTIONS:  - Identify barriers to discharge w/pt and caregiver  - Include patient/family/discharge partner in discharge planning  - Arrange for needed discharge resources and transportation as appropriate  - Identify discharge learning needs (meds, wound care, etc)  - Arrange for interpreters to assist at discharge as needed  - Consider post-discharge preferences of patient/family/discharge partner  - Complete POLST form as appropriate  - Assess patient's ability to be responsible for managing their own health  - Refer to Case Management Department for coordinating discharge planning if the patient needs post-hospital services based on physician/LIP order or complex needs related to functional status, cognitive ability or social support system  Outcome: Progressing     Problem: METABOLIC/FLUID AND ELECTROLYTES - ADULT  Goal: Glucose maintained within prescribed range  Description: INTERVENTIONS:  -  Monitor Blood Glucose as ordered  - Assess for signs and symptoms of hyperglycemia and hypoglycemia  - Administer ordered medications to maintain glucose within target range  - Assess barriers to adequate nutritional intake and initiate nutrition consult as needed  - Instruct patient on self management of diabetes  Outcome: Progressing  Goal: Electrolytes maintained within normal limits  Description: INTERVENTIONS:  - Monitor Blood Glucose as ordered  - Assess for signs and symptoms of hyperglycemia and hypoglycemia  - Administer ordered medications to maintain glucose within target range  - Assess barriers to adequate nutritional intake and initiate nutrition consult as needed  - Instruct patient on self management of diabetes  Outcome: Progressing  Goal: Hemodynamic stability and optimal renal function maintained  Description: INTERVENTIONS:  - Monitor labs and rhythm and assess patient for signs and symptoms of electrolyte imbalances  - Administer electrolyte replacement as ordered  - Monitor response to electrolyte replacements, including rhythm and repeat lab results as appropriate  - Fluid restriction as ordered  - Instruct patient on fluid and nutrition restrictions as appropriate  Outcome: Progressing     Problem: SKIN/TISSUE INTEGRITY - ADULT  Goal: Skin integrity remains intact  Description: INTERVENTIONS  - Assess and document risk factors for pressure ulcer development  - Assess and document skin integrity  - Monitor for areas of redness and/or skin breakdown  - Initiate interventions, skin care algorithm/standards of care as needed  Outcome: Progressing

## 2024-04-06 NOTE — PROGRESS NOTES
[facility]acy Dosing Service      Initial Pharmacokinetic Consult for Vancomycin Dosing     Chandler Lancaster is a 43 year old male who is being initiated on vancomycin therapy for cellulitis.  Pharmacy has been asked to dose vancomycin by Ortiz Hebert MD .  The initial treatment and monitoring approach will be non-AUC strategy.        Weight and Temperature:    Wt Readings from Last 1 Encounters:   24 109.9 kg (242 lb 4.8 oz)        Temp Readings from Last 1 Encounters:   24 98.4 °F (36.9 °C) (Oral)      Labs:   Recent Labs   Lab 24  1415   CREATSERUM 1.44*      Estimated Creatinine Clearance: 79.9 mL/min (A) (based on SCr of 1.44 mg/dL (H)).     Recent Labs   Lab 24  1415   WBC 10.6          The Pharmacokinetic Target is:    Trough/random 10-15 mg/L    Renal Dosing Considerations:    KAYCE/ARF     Assessment/Plan:   Initial/Loading dose: Has received 1750 mg IV (15 mg/kg, capped at 2250 mg) x 1 initial dose.      Maintenance dose: Pharmacy will dose vancomycin at 1750 mg IV every 12 hours    Monitorin) Plan for vancomycin trough to be obtained at steady state    2) Pharmacy will order SCr as clinically indicated to assess renal function.    3) Pharmacy will monitor for toxicity and efficacy, adjust vancomycin dose and/or frequency, and order vancomycin levels as appropriate per the Pharmacy and Therapeutics Committee approved protocol until discontinuation of the medication.       We appreciate the opportunity to assist in the care of this patient.     Javier Edgar, PharmD  2024  9:50 PM  [facility] Extension: [phone]

## 2024-04-06 NOTE — PLAN OF CARE
Problem: Diabetes/Glucose Control  Goal: Glucose maintained within prescribed range  Description: INTERVENTIONS:  - Monitor Blood Glucose as ordered  - Assess for signs and symptoms of hyperglycemia and hypoglycemia  - Administer ordered medications to maintain glucose within target range  - Assess barriers to adequate nutritional intake and initiate nutrition consult as needed  - Instruct patient on self management of diabetes  Outcome: Progressing     Problem: Patient Centered Care  Goal: Patient preferences are identified and integrated in the patient's plan of care  Description: Interventions:  - What would you like us to know as we care for you? Lives alone  - Provide timely, complete, and accurate information to patient/family  - Incorporate patient and family knowledge, values, beliefs, and cultural backgrounds into the planning and delivery of care  - Encourage patient/family to participate in care and decision-making at the level they choose  - Honor patient and family perspectives and choices  Outcome: Progressing     Problem: Patient/Family Goals  Goal: Patient/Family Long Term Goal  Description: Patient's Long Term Goal: go home    Interventions:  - IVF   - IV antibiotics  - monitor vital signs  - monitor lab results  - ID on consult  - See additional Care Plan goals for specific interventions  Outcome: Progressing  Goal: Patient/Family Short Term Goal  Description: Patient's Short Term Goal: get better    Interventions:   - IVF  -IV antibiotics  - monitor lab results  - monitor vital signs  - See additional Care Plan goals for specific interventions  Outcome: Progressing     Problem: PAIN - ADULT  Goal: Verbalizes/displays adequate comfort level or patient's stated pain goal  Description: INTERVENTIONS:  - Encourage pt to monitor pain and request assistance  - Assess pain using appropriate pain scale  - Administer analgesics based on type and severity of pain and evaluate response  - Implement  non-pharmacological measures as appropriate and evaluate response  - Consider cultural and social influences on pain and pain management  - Manage/alleviate anxiety  - Utilize distraction and/or relaxation techniques  - Monitor for opioid side effects  - Notify MD/LIP if interventions unsuccessful or patient reports new pain  - Anticipate increased pain with activity and pre-medicate as appropriate  Outcome: Progressing     Problem: SAFETY ADULT - FALL  Goal: Free from fall injury  Description: INTERVENTIONS:  - Assess pt frequently for physical needs  - Identify cognitive and physical deficits and behaviors that affect risk of falls.  - [place] fall precautions as indicated by assessment.  - Educate pt/family on patient safety including physical limitations  - Instruct pt to call for assistance with activity based on assessment  - Modify environment to reduce risk of injury  - Provide assistive devices as appropriate  - Consider OT/PT consult to assist with strengthening/mobility  - Encourage toileting schedule  Outcome: Progressing     Problem: DISCHARGE PLANNING  Goal: Discharge to home or other facility with appropriate resources  Description: INTERVENTIONS:  - Identify barriers to discharge w/pt and caregiver  - Include patient/family/discharge partner in discharge planning  - Arrange for needed discharge resources and transportation as appropriate  - Identify discharge learning needs (meds, wound care, etc)  - Arrange for interpreters to assist at discharge as needed  - Consider post-discharge preferences of patient/family/discharge partner  - Complete POLST form as appropriate  - Assess patient's ability to be responsible for managing their own health  - Refer to Case Management Department for coordinating discharge planning if the patient needs post-hospital services based on physician/LIP order or complex needs related to functional status, cognitive ability or social support system  Outcome:  Progressing     Problem: METABOLIC/FLUID AND ELECTROLYTES - ADULT  Goal: Glucose maintained within prescribed range  Description: INTERVENTIONS:  - Monitor Blood Glucose as ordered  - Assess for signs and symptoms of hyperglycemia and hypoglycemia  - Administer ordered medications to maintain glucose within target range  - Assess barriers to adequate nutritional intake and initiate nutrition consult as needed  - Instruct patient on self management of diabetes  Outcome: Progressing  Goal: Electrolytes maintained within normal limits  Description: INTERVENTIONS:  - Monitor labs and rhythm and assess patient for signs and symptoms of electrolyte imbalances  - Administer electrolyte replacement as ordered  - Monitor response to electrolyte replacements, including rhythm and repeat lab results as appropriate  - Fluid restriction as ordered  - Instruct patient on fluid and nutrition restrictions as appropriate  Outcome: Progressing  Goal: Hemodynamic stability and optimal renal function maintained  Description: INTERVENTIONS:  - Monitor labs and assess for signs and symptoms of volume excess or deficit  - Monitor intake, output and patient weight  - Monitor urine specific gravity, serum osmolarity and serum sodium as indicated or ordered  - Monitor response to interventions for patient's volume status, including labs, urine output, blood pressure (other measures as available)  - Encourage oral intake as appropriate  - Instruct patient on fluid and nutrition restrictions as appropriate  Outcome: Progressing   Denies complaint of pain. Up and about in room per self. IVF, continue with IV antibiotics. Vital signs stable. Will continue to monitor. Bed on low and locked position, call light within reach.

## 2024-04-07 VITALS
SYSTOLIC BLOOD PRESSURE: 150 MMHG | OXYGEN SATURATION: 98 % | WEIGHT: 242.31 LBS | BODY MASS INDEX: 30.13 KG/M2 | RESPIRATION RATE: 20 BRPM | DIASTOLIC BLOOD PRESSURE: 98 MMHG | HEART RATE: 93 BPM | HEIGHT: 75 IN | TEMPERATURE: 98 F

## 2024-04-07 LAB
ANION GAP SERPL CALC-SCNC: 10 MMOL/L (ref 0–18)
BASOPHILS # BLD AUTO: 0.03 X10(3) UL (ref 0–0.2)
BASOPHILS NFR BLD AUTO: 0.3 %
BUN BLD-MCNC: 8 MG/DL (ref 9–23)
BUN/CREAT SERPL: 8.5 (ref 10–20)
CALCIUM BLD-MCNC: 8.9 MG/DL (ref 8.7–10.4)
CHLORIDE SERPL-SCNC: 103 MMOL/L (ref 98–112)
CO2 SERPL-SCNC: 20 MMOL/L (ref 21–32)
CREAT BLD-MCNC: 0.94 MG/DL
DEPRECATED RDW RBC AUTO: 35.7 FL (ref 35.1–46.3)
EGFRCR SERPLBLD CKD-EPI 2021: 104 ML/MIN/1.73M2 (ref 60–?)
EOSINOPHIL # BLD AUTO: 0.12 X10(3) UL (ref 0–0.7)
EOSINOPHIL NFR BLD AUTO: 1.2 %
ERYTHROCYTE [DISTWIDTH] IN BLOOD BY AUTOMATED COUNT: 12.6 % (ref 11–15)
GLUCOSE BLD-MCNC: 241 MG/DL (ref 70–99)
GLUCOSE BLDC GLUCOMTR-MCNC: 211 MG/DL (ref 70–99)
GLUCOSE BLDC GLUCOMTR-MCNC: 237 MG/DL (ref 70–99)
GLUCOSE BLDC GLUCOMTR-MCNC: 257 MG/DL (ref 70–99)
HCT VFR BLD AUTO: 38 %
HGB BLD-MCNC: 13 G/DL
IMM GRANULOCYTES # BLD AUTO: 0.04 X10(3) UL (ref 0–1)
IMM GRANULOCYTES NFR BLD: 0.4 %
LYMPHOCYTES # BLD AUTO: 0.7 X10(3) UL (ref 1–4)
LYMPHOCYTES NFR BLD AUTO: 7.2 %
MCH RBC QN AUTO: 27.1 PG (ref 26–34)
MCHC RBC AUTO-ENTMCNC: 34.2 G/DL (ref 31–37)
MCV RBC AUTO: 79.2 FL
MONOCYTES # BLD AUTO: 1.12 X10(3) UL (ref 0.1–1)
MONOCYTES NFR BLD AUTO: 11.5 %
NEUTROPHILS # BLD AUTO: 7.71 X10 (3) UL (ref 1.5–7.7)
NEUTROPHILS # BLD AUTO: 7.71 X10(3) UL (ref 1.5–7.7)
NEUTROPHILS NFR BLD AUTO: 79.4 %
OSMOLALITY SERPL CALC.SUM OF ELEC: 282 MOSM/KG (ref 275–295)
PLATELET # BLD AUTO: 262 10(3)UL (ref 150–450)
POTASSIUM SERPL-SCNC: 3.9 MMOL/L (ref 3.5–5.1)
RBC # BLD AUTO: 4.8 X10(6)UL
SODIUM SERPL-SCNC: 133 MMOL/L (ref 136–145)
VANCOMYCIN PEAK SERPL-MCNC: 21.3 UG/ML (ref 30–50)
WBC # BLD AUTO: 9.7 X10(3) UL (ref 4–11)

## 2024-04-07 PROCEDURE — 99239 HOSP IP/OBS DSCHRG MGMT >30: CPT | Performed by: INTERNAL MEDICINE

## 2024-04-07 RX ORDER — DOXYCYCLINE HYCLATE 100 MG/1
100 CAPSULE ORAL 2 TIMES DAILY
Qty: 14 CAPSULE | Refills: 0 | Status: SHIPPED | OUTPATIENT
Start: 2024-04-07 | End: 2024-04-14

## 2024-04-07 RX ORDER — AMOXICILLIN AND CLAVULANATE POTASSIUM 875; 125 MG/1; MG/1
1 TABLET, FILM COATED ORAL 2 TIMES DAILY
Qty: 14 TABLET | Refills: 0 | Status: SHIPPED | OUTPATIENT
Start: 2024-04-07 | End: 2024-04-14

## 2024-04-07 RX ORDER — LISINOPRIL 10 MG/1
10 TABLET ORAL DAILY
Qty: 90 TABLET | Refills: 1 | Status: SHIPPED | OUTPATIENT
Start: 2024-04-08

## 2024-04-07 RX ORDER — GLIPIZIDE 2.5 MG/1
2.5 TABLET, EXTENDED RELEASE ORAL
Qty: 30 TABLET | Refills: 2 | Status: SHIPPED | OUTPATIENT
Start: 2024-04-07

## 2024-04-07 NOTE — PLAN OF CARE
Problem: Diabetes/Glucose Control  Goal: Glucose maintained within prescribed range  Description: INTERVENTIONS:  - Monitor Blood Glucose as ordered  - Assess for signs and symptoms of hyperglycemia and hypoglycemia  - Administer ordered medications to maintain glucose within target range  - Assess barriers to adequate nutritional intake and initiate nutrition consult as needed  - Instruct patient on self management of diabetes  Outcome: Adequate for Discharge     Problem: Patient Centered Care  Goal: Patient preferences are identified and integrated in the patient's plan of care  Description: Interventions:  - What would you like us to know as we care for you? Lives at home alone  - Provide timely, complete, and accurate information to patient/family  - Incorporate patient and family knowledge, values, beliefs, and cultural backgrounds into the planning and delivery of care  - Encourage patient/family to participate in care and decision-making at the level they choose  - Honor patient and family perspectives and choices  Outcome: Adequate for Discharge     Problem: Patient/Family Goals  Goal: Patient/Family Long Term Goal  Description: Patient's Long Term Goal: go home    Interventions:  -   - See additional Care Plan goals for specific interventions  Outcome: Adequate for Discharge  Goal: Patient/Family Short Term Goal  Description: Patient's Short Term Goal: home    Interventions:   -   - See additional Care Plan goals for specific interventions  Outcome: Adequate for Discharge     Problem: PAIN - ADULT  Goal: Verbalizes/displays adequate comfort level or patient's stated pain goal  Description: INTERVENTIONS:  - Encourage pt to monitor pain and request assistance  - Assess pain using appropriate pain scale  - Administer analgesics based on type and severity of pain and evaluate response  - Implement non-pharmacological measures as appropriate and evaluate response  - Consider cultural and social influences on  pain and pain management  - Manage/alleviate anxiety  - Utilize distraction and/or relaxation techniques  - Monitor for opioid side effects  - Notify MD/LIP if interventions unsuccessful or patient reports new pain  - Anticipate increased pain with activity and pre-medicate as appropriate  Outcome: Adequate for Discharge     Problem: SAFETY ADULT - FALL  Goal: Free from fall injury  Description: INTERVENTIONS:  - Assess pt frequently for physical needs  - Identify cognitive and physical deficits and behaviors that affect risk of falls.  - [place] fall precautions as indicated by assessment.  - Educate pt/family on patient safety including physical limitations  - Instruct pt to call for assistance with activity based on assessment  - Modify environment to reduce risk of injury  - Provide assistive devices as appropriate  - Consider OT/PT consult to assist with strengthening/mobility  - Encourage toileting schedule  Outcome: Adequate for Discharge     Problem: DISCHARGE PLANNING  Goal: Discharge to home or other facility with appropriate resources  Description: INTERVENTIONS:  - Identify barriers to discharge w/pt and caregiver  - Include patient/family/discharge partner in discharge planning  - Arrange for needed discharge resources and transportation as appropriate  - Identify discharge learning needs (meds, wound care, etc)  - Arrange for interpreters to assist at discharge as needed  - Consider post-discharge preferences of patient/family/discharge partner  - Complete POLST form as appropriate  - Assess patient's ability to be responsible for managing their own health  - Refer to Case Management Department for coordinating discharge planning if the patient needs post-hospital services based on physician/LIP order or complex needs related to functional status, cognitive ability or social support system  Outcome: Adequate for Discharge     Problem: METABOLIC/FLUID AND ELECTROLYTES - ADULT  Goal: Glucose maintained  within prescribed range  Description: INTERVENTIONS:  - Monitor Blood Glucose as ordered  - Assess for signs and symptoms of hyperglycemia and hypoglycemia  - Administer ordered medications to maintain glucose within target range  - Assess barriers to adequate nutritional intake and initiate nutrition consult as needed  - Instruct patient on self management of diabetes  Outcome: Adequate for Discharge  Goal: Electrolytes maintained within normal limits  Description: INTERVENTIONS:  - Monitor labs and rhythm and assess patient for signs and symptoms of electrolyte imbalances  - Administer electrolyte replacement as ordered  - Monitor response to electrolyte replacements, including rhythm and repeat lab results as appropriate  - Fluid restriction as ordered  - Instruct patient on fluid and nutrition restrictions as appropriate  Outcome: Adequate for Discharge  Goal: Hemodynamic stability and optimal renal function maintained  Description: INTERVENTIONS:  - Monitor labs and assess for signs and symptoms of volume excess or deficit  - Monitor intake, output and patient weight  - Monitor urine specific gravity, serum osmolarity and serum sodium as indicated or ordered  - Monitor response to interventions for patient's volume status, including labs, urine output, blood pressure (other measures as available)  - Encourage oral intake as appropriate  - Instruct patient on fluid and nutrition restrictions as appropriate  Outcome: Adequate for Discharge     Problem: SKIN/TISSUE INTEGRITY - ADULT  Goal: Skin integrity remains intact  Description: INTERVENTIONS  - Assess and document risk factors for pressure ulcer development  - Assess and document skin integrity  - Monitor for areas of redness and/or skin breakdown  - Initiate interventions, skin care algorithm/standards of care as needed  Outcome: Adequate for Discharge  No complaint of pain . Vital signs stable. Up and about in room per self.Discharge order written by   Emilee after examination. Saline lock discontinued. Education of insulin administration done, patient did self injection of insulin with this RN's assistance. Patient made aware that he might have a different needle that he will be using at home, make sure that the cap, which some has 2 cap/cover , are removed before injecting insulin. Patient verbalized and demonstrate understanding on insulin administration. Discharge instructions given and discussed with patient, which includes medications to continue taking at home and when to take the next dose. Patient also aware of 6 medications that has been e-prescribed to his pharmacy. Patient to call Dr. Hebert's office to set up an appointment in 1 week. Patient verbalized understanding of all instructions.  Patient discharged home in stable condition.

## 2024-04-07 NOTE — DISCHARGE SUMMARY
DISCHARGE SUMMARY     Chandler Lancaster Patient Status:  Inpatient    1980 MRN [id number]   Location [facility] 1W Attending Ortiz Hebert MD   Hosp Day # 2 PCP PRATEEK HEBERT MD     Date of Admission: 2024  Date of Discharge: 24  Discharge Disposition: Home or Self Care    Admitting Diagnosis:   Cellulitis of scalp [L03.811]    Hospital Discharge Diagnoses: cellulitis of scalp, poorly controlled dm     TCM Diagnosis at discharge from Hospital: Other: cellulitis, dm ; still recommend for TCM follow-up    Please note that only patients enrolled in the Medicare ACO, BCBS ACO and [facility] HMOs will be handled by a member of the Care Management Team.  For all other patients, please follow usual protocol for discharge care transition.    Lace+ Score: 34  59-90 High Risk  29-58 Medium Risk  0-28   Low Risk.    Risk of readmission: Chandler Lancaster has Moderate Risk of readmission after discharge from the hospital.    History of Present Illness:\         Cellulitis of scalp  Will dc on doxy and Augmentin as per ID        Type 2 diabetes mellitus without retinopathy (HCC)  Bs still in the 200's, will dc on lantus 10 units at night, also metformin 500 bid and glipizide 2,5 mg in am , pt told to monitor diet also to check  bs at least 3 x a day until he sees us ion office either Dr Fuentes or me either this or next week.pt says know how to use the insuline labstus and how to check BS    will have NURSE SHOW HIM TO BE SURE     A1c still pending       Acute renal failure (HCC)resolved, will monitor        Brief Synopsis:     Discharge Physical Exam: General appearance: alert, appears stated age, and cooperative  Head: Normocephalic, without obvious abnormality, atraumatic  Nose: Nares normal. Septum midline. Mucosa normal. No drainage or sinus tenderness.  Throat: lips, mucosa, and tongue normal; teeth and gums normal  Pulmonary:  clear to auscultation bilaterally  Cardiovascular: S1, S2 normal, no  murmur, click, rub or gallop, regular rate and rhythm  Extremities: extremities normal, atraumatic, no cyanosis or edema  Lymph nodes: Cervical, supraclavicular, and axillary nodes normal.  Neurologic: Grossly normal    Procedures during hospitalization:       Incidental or significant findings and recommendations (brief descriptions):      Lab/Test results pending at Discharge:       Consultants:      Discharge Medication List:     Discharge Medications        START taking these medications        Instructions Prescription details   amoxicillin clavulanate 875-125 MG Tabs  Commonly known as: Augmentin      Take 1 tablet by mouth 2 (two) times daily for 7 days.   Stop taking on: April 14, 2024  Quantity: 14 tablet  Refills: 0     doxycycline 100 MG Caps  Commonly known as: Vibramycin      Take 1 capsule (100 mg total) by mouth 2 (two) times daily for 7 days.   Stop taking on: April 14, 2024  Quantity: 14 capsule  Refills: 0            CONTINUE taking these medications        Instructions Prescription details   BD Pen Needle Short U/F 31G X 8 MM Misc  Generic drug: Insulin Pen Needle       Refills: 0     FreeStyle Lancets Misc       Refills: 0     FreeStyle Lite Selena      FPD   Refills: 0            ASK your doctor about these medications        Instructions Prescription details   FreeStyle Lite Test Strp      U UTD BID   Refills: 3     insulin detemir 100 UNIT/ML Sopn  Commonly known as: Levemir      Inject 7 Units into the skin nightly.   Quantity: 4 pen  Refills: 2     lisinopril 5 MG Tabs  Commonly known as: Prinivil; Zestril      Take 1 tablet (5 mg total) by mouth daily.   Quantity: 90 tablet  Refills: 0     metFORMIN HCl 1000 MG Tabs  Commonly known as: GLUCOPHAGE      Take 0.5 tablets (500 mg total) by mouth 2 (two) times daily with meals.   Quantity: 360 tablet  Refills: 0               Where to Get Your Medications        These medications were sent to [id number] - [place], [place] - [facility]  [facility] AT [facility] OF [address], [phone], [phone] [address] [facility], [facility] [address]      Phone: [phone]   amoxicillin clavulanate 875-125 MG Tabs  doxycycline 100 MG Caps         Discharge Plan:  Discharge Condition: Good    Current Discharge Medication List        New Orders    Details   doxycycline 100 MG Oral Cap Take 1 capsule (100 mg total) by mouth 2 (two) times daily for 7 days.      amoxicillin clavulanate 875-125 MG Oral Tab Take 1 tablet by mouth 2 (two) times daily for 7 days.           Home Meds - Unchanged    Details   metFORMIN HCl 1000 MG Oral Tab Take 0.5 tablets (500 mg total) by mouth 2 (two) times daily with meals.      lisinopril 5 MG Oral Tab Take 1 tablet (5 mg total) by mouth daily.      insulin detemir 100 UNIT/ML Subcutaneous Solution Pen-injector Inject 7 Units into the skin nightly.      Blood Glucose Monitoring Suppl (FREESTYLE LITE) Does not apply Device FPD      Glucose Blood (FREESTYLE LITE TEST) In Vitro Strip U UTD BID      FREESTYLE LANCETS Does not apply Misc       BD PEN NEEDLE SHORT U/F 31G X 8 MM Does not apply Misc                  Discharge Diet: ada 1800 ccal     Discharge Activity: As tolerated    Follow-up appointment:   No follow-up provider specified.    Vital signs:  Temp:  [97.4 °F (36.3 °C)-98.8 °F (37.1 °C)] 97.4 °F (36.3 °C)  Pulse:  [] 80  Resp:  [18-20] 20  BP: (146-161)/() 146/86  SpO2:  [97 %-98 %] 97 %    -----------------------------------------------------------------------------------------------  PATIENT DISCHARGE INSTRUCTIONS: See electronic chart    Ortiz Hebert MD 4/7/2024    Time spent:  30 to 74 minutes

## 2024-04-07 NOTE — PROGRESS NOTES
[facility] ID PROGRESS NOTE    Chandler Lancaster Patient Status:  Inpatient    1980 MRN [id number]   Location [facility] 1W Attending Ortiz Hebert MD   Hosp Day # 2 PCP PRATEEK HEBERT MD     SUBJECTIVE  ROS done. No fever w/stable vitals. Awake and feeling better. Pain controlled.    ASSESSMENT:     Antibiotics: Vancomycin, CTX     # Occipital scalp cellulitis  # Uncontrolled DM     PLAN:     -  Continue vancomycin + CTX. Plan to switch to po doxy + augmentin on DC x7d. Script in chart.  -  FU bcx  -  Monitor exam for improvement.  -  BS control.  -  Follow fever curve, wbc  -  Reviewed labs, micro, imaging reports, available old records  -  D/w patient, RN     History of Present Illness:  Chandler Lancaster is a a(n) 43 year old male with hx of DM. Pt presents to ER 4/5 with occipital head lump. Initially pt reports lesion started as a pimple that he popped and has gotten progressively worse. No fevers, chills or drainage. On admit, no fever. Wbc 10.6. LA 1.2. . CTH with extensive L suboccipital subcutaneous infiltration with skin thickening extending to posterior paraspinous musculature. No abscess. Blood cx sent.Pt on vancomycin + CTX. ID consulted.     OBJECTIVE    BP (!) 152/99 (BP Location: Right arm)   Pulse 100   Temp 98.8 °F (37.1 °C) (Oral)   Resp 18   Ht 6' 3\" (1.905 m)   Wt 242 lb 4.8 oz (109.9 kg)   SpO2 98%   BMI 30.29 kg/m²     Gen:   NAD, awake, alert  HEENT:  PERRLA/EOMI, neck supple, occipital area of swelling improved, non erythematous, nontender  CV/lungs:  RRR, CTAB  Abdom:  Soft, NT/ND, +BS  Skin/extrem:  No rashes, no c/c/e    Laboratory Data: Reviewed     Microbiology: Reviewed     Radiology: Reviewed      JUAN ANTONIO [facility]  [phone]  ,

## 2024-04-09 ENCOUNTER — PATIENT OUTREACH (OUTPATIENT)
Dept: CASE MANAGEMENT | Age: 44
End: 2024-04-09

## 2024-04-09 DIAGNOSIS — Z02.9 ENCOUNTERS FOR ADMINISTRATIVE PURPOSE: ICD-10-CM

## 2024-04-09 DIAGNOSIS — L03.811 CELLULITIS OF SCALP: Primary | ICD-10-CM

## 2024-04-09 LAB — HGBA1C: >15.5 %

## 2024-04-09 PROCEDURE — 1111F DSCHRG MED/CURRENT MED MERGE: CPT

## 2024-04-09 NOTE — PAYOR COMM NOTE
--------------  ADMISSION REVIEW     Payor: LEIDY  Subscriber #:  [id number]  Authorization Number: [id number]    Admit date: 4/5/24  Admit time:  8:51 PM       History   HPI  43-year-old male with a history of diabetes who has been off metformin given side effects he experienced who presents to the emergency department with 3 days of lump to his occipital head.  He denies any fevers or chills or drainage.  He initially reports a pimple that he popped and since then has had worsening swelling.  He was referred to the emergency department by an outside clinic.    ED Triage Vitals [04/05/24 1404]   /88   Pulse 84   Resp 20   Temp 97.5 °F (36.4 °C)   Temp src Temporal   SpO2 100 %   O2 Device None (Room air)   Physical Exam  Head: Left posterior occipital induration without focal area of fluctuance approx least 5 cm in diameter.  There is associated mild tenderness, no erythema.  No crepitus or bullae or expressible discharge  Mouth/Throat/Ears/Nose: Oropharynx is clear   Eyes: Conjunctivae and EOM are normal.   Neck: Normal range of motion. Supple.  No midline cervical tenderness.  Cardiovascular: Normal rate, regular rhythm, normal heart sounds.  Respiratory/Chest: Clear and equal bilaterally. Exhibits no tenderness.  Gastrointestinal: Soft, non-tender, non-distended. Bowel sounds are normal.   Musculoskeletal:No swelling or deformity.   Neurological: Alert and appropriate. No focal deficits.   Skin: Skin is warm and dry. No pallor.  Psychiatric: Has a normal mood and affect.    Labs Reviewed   COMP METABOLIC PANEL (14) - Abnormal; Notable for the following components:       Result Value    Glucose 535 (*)     Sodium 133 (*)     Creatinine 1.44 (*)     Calculated Osmolality 301 (*)     Alkaline Phosphatase 132 (*)     Total Protein 8.4 (*)     All other components within normal limits   POCT GLUCOSE - Abnormal; Notable for the following components:    POC Glucose  527 (*)     All other components within  normal limits   POCT GLUCOSE - Abnormal; Notable for the following components:    POC Glucose  510 (*)     All other components within normal limits   POCT GLUCOSE - Abnormal; Notable for the following components:    POC Glucose  379 (*)     All other components within normal limits   POCT GLUCOSE - Abnormal; Notable for the following components:    POC Glucose  366 (*)     All other components within normal limits   POCT GLUCOSE - Abnormal; Notable for the following components:    POC Glucose  252 (*)     All other components within normal limits   CBC W/ DIFFERENTIAL - Abnormal; Notable for the following components:    Neutrophil Absolute Prelim 8.34 (*)     Neutrophil Absolute 8.34 (*)    Imaging Results Available and Reviewed while in ED: CT BRAIN (W+WO) (KAB=[id number])  Result Date: 4/5/2024  CONCLUSION:  1. Extensive subcutaneous infiltration in the left suboccipital region with associated skin thickening.  Infiltration extends to the left posterior paraspinous musculature.  No associated well-defined/drainable rim enhancing collection to suggest abscess.  Consider cellulitis or other soft tissue infection in the appropriate clinical setting. 2. No acute intracranial process by noncontrast and contrast enhanced CT technique.   Dictated by (CST): Dakota Dominique MD on 4/05/2024 at 6:08 PM     Finalized by (CST): Dakota Dominique MD on 4/05/2024 at 6:13 PM         ED Medications Administered:   Medications   glucose (Dex4) 15 GM/59ML oral liquid 15 g (has no administration in time range)     Or   glucose (Glutose) 40% oral gel 15 g (has no administration in time range)     Or   glucose-vitamin C (Dex-4) chewable tab 4 tablet (has no administration in time range)     Or   dextrose 50% injection 50 mL (has no administration in time range)     Or   glucose (Dex4) 15 GM/59ML oral liquid 30 g (has no administration in time range)     Or   glucose (Glutose) 40% oral gel 30 g (has no administration in time range)     Or    glucose-vitamin C (Dex-4) chewable tab 8 tablet (has no administration in time range)   vancomycin (Vancocin) 1.75 g in sodium chloride 0.9% 500mL IVPB premix (has no administration in time range)   cefTRIAXone (Rocephin) 2 g in D5W 100 mL IVPB-ADD (2 g Intravenous New Bag 4/5/24 1913)   sodium chloride 0.9 % IV bolus 1,000 mL (0 mL Intravenous Stopped 4/5/24 1512)   sodium chloride 0.9 % IV bolus 1,000 mL (0 mL Intravenous Stopped 4/5/24 1632)   insulin aspart (NovoLOG) 100 Units/mL vial 17 Units (17 Units Subcutaneous Given 4/5/24 1632)   iopamidol 76% (ISOVUE-370) injection for power injector (80 mL Intravenous Given 4/5/24 1748)      Disposition and Plan   Clinical Impression:  1. Cellulitis of scalp      Disposition:  Admit      History and Physical     Date:  4/6/2024  HPI:    43-year-old male with a history of diabetes non compliant with treatment  presented  to the emergency department with 3 days of lump t and pain of his occipital head, says there was like a pimple initially which he popped.  He denies any fevers or chills .  In ER ct done no abscess but cellulitis of scalp going all the way down left posterior paraspinous musculature. Pt also was found to have elevated glucose in to the 500 in ER.      Blood pressure (!) 144/100, pulse 99, temperature 98.3 °F (36.8 °C), temperature source Oral, resp. rate 18, height 6' 3\" (1.905 m), weight 242 lb 4.8 oz (109.9 kg), SpO2 97%.     Skin:     Comments: Scalp swelling    Neurological:      Mental Status: He is alert and oriented to person, place, and time.   Psychiatric:         Mood and Affect: Mood normal.     Lab Results   Component Value Date     WBC 10.6 04/05/2024     HGB 14.3 04/05/2024     HCT 43.1 04/05/2024     .0 04/05/2024     CREATSERUM 1.44 (H) 04/05/2024     BUN 15 04/05/2024      (L) 04/05/2024     K 4.8 04/05/2024     CL 98 04/05/2024     CO2 24.0 04/05/2024      () 04/05/2024     CA 9.6 04/05/2024     ALB 4.8  04/05/2024     ALKPHO 132 (H) 04/05/2024     BILT 0.4 04/05/2024     TP 8.4 (H) 04/05/2024     AST 21 04/05/2024     ALT 19 04/05/2024   CT BRAIN (W+WO) (ZOH=32965)   Result Date: 4/5/2024  CONCLUSION:         1. Extensive subcutaneous infiltration in the left suboccipital region with associated skin thickening.  Infiltration extends to the left posterior paraspinous musculature.  No associated well-defined/drainable rim enhancing collection to suggest abscess.  Consider cellulitis or other soft tissue infection in the appropriate clinical setting. 2. No acute intracranial process by noncontrast and contrast enhanced CT technique.   Dictated by (CST): Dakota Dominique MD on 4/05/2024 at 6:08 PM     Finalized by (CST): Dakota Dominique MD on 4/05/2024 at 6:13 PM              Assessment/Plan:     Cellulitis of scalp  On IV ab, id on board will follow       Type 2 diabetes mellitus without retinopathy (HCC)  BS elevated, will treat with IVF and insuline , numbers have come down but still in the 200's will adjust the insuline coverage        Acute renal failure (HCC)  IVF, will follow        INFECTIOUS DISEASE  ASSESSMENT:     Antibiotics: Vancomycin, CTX     # Occipital scalp cellulitis  # Uncontrolled DM     PLAN:     -  Continue vancomycin + CTX  -  FU bcx  -  Monitor exam for improvement.  -  BS control.  -  Follow fever curve, wbc    DATE OF DISCHARGE: 4/7/24  Vitals (last day) before discharge       Date/Time Temp Pulse Resp BP SpO2 Weight O2 Device O2 Flow Rate (L/min) Who    04/07/24 1511 98.3 °F (36.8 °C) 93 20 150/98 98 % -- None (Room air) -- MM    04/07/24 0756 97.4 °F (36.3 °C) 80 20 146/86 97 % -- None (Room air) -- CP    04/07/24 0312 98.8 °F (37.1 °C) 100 18 152/99 -- -- None (Room air) -- BL    04/06/24 1956 98.2 °F (36.8 °C) 91 18 161/104 98 % -- -- -- BL    04/06/24 1522 98.7 °F (37.1 °C) 88 18 152/99 97 % -- None (Room air) -- MM    04/06/24 0739 98.3 °F (36.8 °C) 99 18 144/100 97 % -- None (Room air)  -- CP    04/06/24 0516 98 °F (36.7 °C) 99 18 156/100 98 % -- None (Room air) -- BL

## 2024-04-09 NOTE — PROGRESS NOTES
Initial Post Discharge Follow Up   Discharge Date: 4/7/24  Contact Date: 4/9/2024    Consent Verification:  Assessment Completed With: Patient  HIPAA Verified?  Yes    Discharge Dx:   Cellulitis of scalp     General:   How have you been since your discharge from the hospital? I'm feeling fine.    Do you have any pain since discharge?  No    When you were leaving the hospital were your discharge instructions reviewed with you? Yes  How well were your discharge instructions explained to you?   On a scale of 1-5   1- Very Poor and 5- Very well   Very Well  Do you have any questions about your discharge instructions?  No  Before leaving the hospital was your diagnoses explained to you? Yes  Do you have any questions about your diagnoses? No  Are you able to perform normal daily activities of living as you have prior to your hospital stay (dressing, bathing, ambulating to the bathroom, etc)? yes  (NCM) Was patient given a different diet per AVS? no, but pt understands to follow a diabetic diet      Medications:   Current Outpatient Medications   Medication Sig Dispense Refill    doxycycline 100 MG Oral Cap Take 1 capsule (100 mg total) by mouth 2 (two) times daily for 7 days. 14 capsule 0    amoxicillin clavulanate 875-125 MG Oral Tab Take 1 tablet by mouth 2 (two) times daily for 7 days. 14 tablet 0    insulin detemir 100 UNIT/ML Subcutaneous Solution Pen-injector Inject 10 Units into the skin nightly. 6 mL 1    metFORMIN HCl 500 MG Oral Tab Take 1 tablet (500 mg total) by mouth 2 (two) times daily with meals. 60 tablet 2    lisinopril 10 MG Oral Tab Take 1 tablet (10 mg total) by mouth daily. 90 tablet 1    glipiZIDE ER 2.5 MG Oral Tablet 24 Hr Take 1 tablet (2.5 mg total) by mouth daily with breakfast. 30 tablet 2    Blood Glucose Monitoring Suppl (FREESTYLE LITE) Does not apply Device FPD  0    Glucose Blood (FREESTYLE LITE TEST) In Vitro Strip U UTD BID  3    FREESTYLE LANCETS Does not apply Misc       BD PEN NEEDLE  SHORT U/F 31G X 8 MM Does not apply Misc        Were there any changes to your current medication(s) noted on the AVS? Yes  If so, were these medication changes discussed with you prior to leaving the hospital? Yes  If a new medication was prescribed:    Was the new medication's purpose & side effects reviewed? Yes  Do you have any questions about your new medication? No  Did you  your discharge medications when you left the hospital?  Pt states that he got everything but the insulin which he is waiting for the pharmacy to fill.   Let's go over your medications together to make sure we are not missing anything. Medications Reviewed  Are there any reasons that keep you from taking your medication as prescribed? No  Are you having any concerns with constipation? No      Discharge medications reviewed/discussed/and reconciled against outpatient medications with patient.  Any changes or updates to medications sent to PCP.  Patient Acknowledged     Referrals/orders at D/C:  Referrals/orders placed at D/C? no    DME ordered at D/C? No      Discharge orders, AVS reviewed and discussed with patient. Any changes or updates to orders sent to PCP.  Patient Acknowledged      SDOH:   Transportation Needs: No Transportation Needs (4/5/2024)    Transportation Needs     Lack of Transportation: No     Financial Resource Strain: Low Risk  (4/9/2024)    Financial Resource Strain     Difficulty of Paying Living Expenses: Not very hard     Med Affordability: No         Follow up appointments:          TCC  Was TCC ordered: No      PCP (If no TCC appointment)  Does patient already have a PCP appointment scheduled? No  NCM Scheduled PCP office TCM appointment with patient     Specialist    Does the patient have any other follow up appointment(s) needing to be scheduled? No      Is there any reason as to why you cannot make your appointment(s)?  No     Needs post D/C:   Now that you are home, are there any needs or concerns you need  addressed before your next visit with your PCP?  (DME, meds, questions, etc.): No    Interventions by NC:   NCM reviewed discharge instructions and when to seek medical attention with the patient. He states that he is feeling fine. NCM instructed on s/s of infection; he v/u and states that scalp area is better. He denies he has any pain. He has not checked his bs today stating that he was running late for work and didn't get a chance to. NCM discussed bs log and to bring to PCP visit. He v/u. He denied having any fever, n/v/c/d, sob, pain, lightheadedness, HA or any new or worsening symptoms. Med review completed. He denied having any questions or concerns at this time.       CCM referral placed:    No    BOOK BY DATE: 4/21/24

## 2025-04-23 ENCOUNTER — LAB ENCOUNTER (OUTPATIENT)
Dept: LAB | Facility: HOSPITAL | Age: 45
End: 2025-04-23
Attending: PODIATRIST
Payer: COMMERCIAL

## 2025-04-23 ENCOUNTER — HOSPITAL ENCOUNTER (OUTPATIENT)
Dept: MRI IMAGING | Facility: HOSPITAL | Age: 45
Discharge: HOME | End: 2025-04-23
Attending: PODIATRIST
Payer: COMMERCIAL

## 2025-04-23 ENCOUNTER — HOSPITAL ENCOUNTER (OUTPATIENT)
Dept: GENERAL RADIOLOGY | Facility: HOSPITAL | Age: 45
Discharge: HOME | End: 2025-04-23
Attending: PODIATRIST
Payer: COMMERCIAL

## 2025-04-23 DIAGNOSIS — M20.5X1 ACQUIRED HALLUX EXTENSUS OF RIGHT FOOT: ICD-10-CM

## 2025-04-23 DIAGNOSIS — L97.509 DIABETIC FOOT ULCER (HCC): ICD-10-CM

## 2025-04-23 DIAGNOSIS — E11.621 DIABETIC FOOT ULCER (HCC): Primary | ICD-10-CM

## 2025-04-23 DIAGNOSIS — E11.621 DIABETIC FOOT ULCER (HCC): ICD-10-CM

## 2025-04-23 DIAGNOSIS — L97.509 DIABETIC FOOT ULCER (HCC): Primary | ICD-10-CM

## 2025-04-23 LAB
ALBUMIN SERPL-MCNC: 3.9 G/DL (ref 3.2–4.8)
ALBUMIN/GLOB SERPL: 1.1 {RATIO} (ref 1–2)
ALP LIVER SERPL-CCNC: 185 U/L (ref 45–117)
ALT SERPL-CCNC: 15 U/L (ref 10–49)
ANION GAP SERPL CALC-SCNC: 11 MMOL/L (ref 0–18)
AST SERPL-CCNC: 16 U/L (ref ?–34)
BASOPHILS # BLD AUTO: 0.02 X10(3) UL (ref 0–0.2)
BASOPHILS NFR BLD AUTO: 0.2 %
BILIRUB SERPL-MCNC: 0.3 MG/DL (ref 0.3–1.2)
BUN BLD-MCNC: 9 MG/DL (ref 9–23)
BUN/CREAT SERPL: 8 (ref 10–20)
CALCIUM BLD-MCNC: 8.5 MG/DL (ref 8.7–10.4)
CHLORIDE SERPL-SCNC: 88 MMOL/L (ref 98–112)
CO2 SERPL-SCNC: 27 MMOL/L (ref 21–32)
CREAT BLD-MCNC: 1.12 MG/DL (ref 0.7–1.3)
CRP SERPL-MCNC: 29.6 MG/DL (ref ?–1)
DEPRECATED RDW RBC AUTO: 36 FL (ref 35.1–46.3)
EGFRCR SERPLBLD CKD-EPI 2021: 84 ML/MIN/1.73M2 (ref 60–?)
EOSINOPHIL # BLD AUTO: 0.04 X10(3) UL (ref 0–0.7)
EOSINOPHIL NFR BLD AUTO: 0.3 %
ERYTHROCYTE [DISTWIDTH] IN BLOOD BY AUTOMATED COUNT: 12.4 % (ref 11–15)
ERYTHROCYTE [SEDIMENTATION RATE] IN BLOOD: >130 MM/HR (ref 0–15)
EST. AVERAGE GLUCOSE BLD GHB EST-MCNC: >424 MG/DL (ref 68–126)
FASTING STATUS PATIENT QL REPORTED: NO
GLOBULIN PLAS-MCNC: 3.5 G/DL (ref 2–3.5)
GLUCOSE BLD-MCNC: 517 MG/DL (ref 70–99)
HBA1C MFR BLD: >16.4 % (ref ?–5.7)
HCT VFR BLD AUTO: 32.2 % (ref 39–53)
HGB BLD-MCNC: 11 G/DL (ref 13–17.5)
IMM GRANULOCYTES # BLD AUTO: 0.07 X10(3) UL (ref 0–1)
IMM GRANULOCYTES NFR BLD: 0.6 %
LYMPHOCYTES # BLD AUTO: 0.6 X10(3) UL (ref 1–4)
LYMPHOCYTES NFR BLD AUTO: 4.9 %
MCH RBC QN AUTO: 27.3 PG (ref 26–34)
MCHC RBC AUTO-ENTMCNC: 34.2 G/DL (ref 31–37)
MCV RBC AUTO: 79.9 FL (ref 80–100)
MONOCYTES # BLD AUTO: 1.51 X10(3) UL (ref 0.1–1)
MONOCYTES NFR BLD AUTO: 12.3 %
NEUTROPHILS # BLD AUTO: 10.04 X10 (3) UL (ref 1.5–7.7)
NEUTROPHILS # BLD AUTO: 10.04 X10(3) UL (ref 1.5–7.7)
NEUTROPHILS NFR BLD AUTO: 81.7 %
OSMOLALITY SERPL CALC.SUM OF ELEC: 284 MOSM/KG (ref 275–295)
PLATELET # BLD AUTO: 311 10(3)UL (ref 150–450)
POTASSIUM SERPL-SCNC: 3.4 MMOL/L (ref 3.5–5.1)
PROT SERPL-MCNC: 7.4 G/DL (ref 5.7–8.2)
RBC # BLD AUTO: 4.03 X10(6)UL (ref 4.3–5.7)
SODIUM SERPL-SCNC: 126 MMOL/L (ref 136–145)
WBC # BLD AUTO: 12.3 X10(3) UL (ref 4–11)

## 2025-04-23 PROCEDURE — 73718 MRI LOWER EXTREMITY W/O DYE: CPT | Performed by: PODIATRIST

## 2025-04-23 PROCEDURE — 36415 COLL VENOUS BLD VENIPUNCTURE: CPT

## 2025-04-23 PROCEDURE — 83036 HEMOGLOBIN GLYCOSYLATED A1C: CPT

## 2025-04-23 PROCEDURE — 85025 COMPLETE CBC W/AUTO DIFF WBC: CPT

## 2025-04-23 PROCEDURE — 80053 COMPREHEN METABOLIC PANEL: CPT

## 2025-04-23 PROCEDURE — 85652 RBC SED RATE AUTOMATED: CPT

## 2025-04-23 PROCEDURE — 73630 X-RAY EXAM OF FOOT: CPT | Performed by: PODIATRIST

## 2025-04-23 PROCEDURE — 86140 C-REACTIVE PROTEIN: CPT

## 2025-05-20 ENCOUNTER — HOSPITAL ENCOUNTER (OUTPATIENT)
Dept: GENERAL RADIOLOGY | Facility: HOSPITAL | Age: 45
Discharge: HOME | End: 2025-05-20
Attending: PODIATRIST
Payer: COMMERCIAL

## 2025-05-20 ENCOUNTER — LAB ENCOUNTER (OUTPATIENT)
Dept: LAB | Facility: HOSPITAL | Age: 45
End: 2025-05-20
Attending: PODIATRIST
Payer: COMMERCIAL

## 2025-05-20 DIAGNOSIS — M86.171 OSTEOMYELITIS OF ANKLE OR FOOT, RIGHT, ACUTE (HCC): ICD-10-CM

## 2025-05-20 DIAGNOSIS — M86.171 OSTEOMYELITIS OF ANKLE OR FOOT, RIGHT, ACUTE (HCC): Primary | ICD-10-CM

## 2025-05-20 LAB
ATRIAL RATE: 99 BPM
P AXIS: 60 DEGREES
P-R INTERVAL: 146 MS
Q-T INTERVAL: 348 MS
QRS DURATION: 90 MS
QTC CALCULATION (BEZET): 446 MS
R AXIS: 41 DEGREES
T AXIS: 52 DEGREES
VENTRICULAR RATE: 99 BPM

## 2025-05-20 PROCEDURE — 93005 ELECTROCARDIOGRAM TRACING: CPT

## 2025-05-20 PROCEDURE — 71046 X-RAY EXAM CHEST 2 VIEWS: CPT | Performed by: PODIATRIST

## 2025-05-20 PROCEDURE — 93010 ELECTROCARDIOGRAM REPORT: CPT | Performed by: STUDENT IN AN ORGANIZED HEALTH CARE EDUCATION/TRAINING PROGRAM

## 2025-05-21 ENCOUNTER — HOSPITAL ENCOUNTER (OUTPATIENT)
Facility: HOSPITAL | Age: 45
Discharge: HOME | End: 2025-05-21
Attending: PODIATRIST | Admitting: PODIATRIST
Payer: COMMERCIAL

## 2025-05-21 ENCOUNTER — ANESTHESIA (OUTPATIENT)
Dept: SURGERY | Facility: HOSPITAL | Age: 45
End: 2025-05-21
Payer: COMMERCIAL

## 2025-05-21 ENCOUNTER — ANESTHESIA EVENT (OUTPATIENT)
Dept: SURGERY | Facility: HOSPITAL | Age: 45
End: 2025-05-21
Payer: COMMERCIAL

## 2025-05-21 VITALS
BODY MASS INDEX: 27.35 KG/M2 | RESPIRATION RATE: 18 BRPM | HEIGHT: 75 IN | TEMPERATURE: 98 F | SYSTOLIC BLOOD PRESSURE: 136 MMHG | DIASTOLIC BLOOD PRESSURE: 95 MMHG | HEART RATE: 92 BPM | OXYGEN SATURATION: 99 % | WEIGHT: 220 LBS

## 2025-05-21 LAB
GLUCOSE BLDC GLUCOMTR-MCNC: 309 MG/DL (ref 70–99)
GLUCOSE BLDC GLUCOMTR-MCNC: 323 MG/DL (ref 70–99)
GLUCOSE BLDC GLUCOMTR-MCNC: 340 MG/DL (ref 70–99)

## 2025-05-21 PROCEDURE — 82962 GLUCOSE BLOOD TEST: CPT

## 2025-05-21 PROCEDURE — 87206 SMEAR FLUORESCENT/ACID STAI: CPT | Performed by: PODIATRIST

## 2025-05-21 PROCEDURE — 87077 CULTURE AEROBIC IDENTIFY: CPT | Performed by: PODIATRIST

## 2025-05-21 PROCEDURE — 87076 CULTURE ANAEROBE IDENT EACH: CPT | Performed by: PODIATRIST

## 2025-05-21 PROCEDURE — 87205 SMEAR GRAM STAIN: CPT | Performed by: PODIATRIST

## 2025-05-21 PROCEDURE — 87070 CULTURE OTHR SPECIMN AEROBIC: CPT | Performed by: PODIATRIST

## 2025-05-21 PROCEDURE — 87102 FUNGUS ISOLATION CULTURE: CPT | Performed by: PODIATRIST

## 2025-05-21 PROCEDURE — 87186 SC STD MICRODIL/AGAR DIL: CPT | Performed by: PODIATRIST

## 2025-05-21 PROCEDURE — 87176 TISSUE HOMOGENIZATION CULTR: CPT | Performed by: PODIATRIST

## 2025-05-21 PROCEDURE — 88305 TISSUE EXAM BY PATHOLOGIST: CPT | Performed by: PODIATRIST

## 2025-05-21 PROCEDURE — 88311 DECALCIFY TISSUE: CPT | Performed by: PODIATRIST

## 2025-05-21 PROCEDURE — 87075 CULTR BACTERIA EXCEPT BLOOD: CPT | Performed by: PODIATRIST

## 2025-05-21 PROCEDURE — 87116 MYCOBACTERIA CULTURE: CPT | Performed by: PODIATRIST

## 2025-05-21 DEVICE — STIMULAN® RAPID CURE PROVIDED STERILE FOR SINGLE PATIENT USE. STIMULAN® RAPID CURE CONTAINS CALCIUM SULFATE POWDER AND MIXING SOLUTION IN PRE-MEASURED QUANTITIES SO THAT WHEN MIXED TOGETHER IN A STERILE MIXING BOWL, THE RESULTANT PASTE IS TO BE DIGITALLY PACKED INTO OPEN BONE VOID/GAP TO SET INSITU OR PLACED INTO THE MOULD PROVIDED, THE MIXTURE SETS TO FORM BEADS. THE BIODEGRADABLE, RADIOPAQUE BEADS ARE RESORBED IN APPROXIMATELY 30 – 60 DAYS WHEN USED IN ACCORDANCE WITH THE DEVICE LABELLING. STIMULAN® RAPID CURE IS MANUFACTURED FROM SYNTHETIC IMPLANT GRADE CALCIUM SULFATE DIHYDRATE(CASO4.2H2O) THAT RESORBS AND IS REPLACED WITH BONE DURING THE HEALING PROCESS. ALSO, AS THE BONE VOID FILLER BEADS ARE BIODEGRADABLE AND BIOCOMPATIBLE, THEY MAY BE USED AT AN INFECTED SITE.
Type: IMPLANTABLE DEVICE | Site: FOOT | Status: FUNCTIONAL
Brand: STIMULAN® RAPID CURE

## 2025-05-21 RX ORDER — NICOTINE POLACRILEX 4 MG
30 LOZENGE BUCCAL
Status: DISCONTINUED | OUTPATIENT
Start: 2025-05-21 | End: 2025-05-21

## 2025-05-21 RX ORDER — HYDROMORPHONE HYDROCHLORIDE 1 MG/ML
0.6 INJECTION, SOLUTION INTRAMUSCULAR; INTRAVENOUS; SUBCUTANEOUS EVERY 5 MIN PRN
Status: DISCONTINUED | OUTPATIENT
Start: 2025-05-21 | End: 2025-05-21

## 2025-05-21 RX ORDER — SODIUM CHLORIDE, SODIUM LACTATE, POTASSIUM CHLORIDE, CALCIUM CHLORIDE 600; 310; 30; 20 MG/100ML; MG/100ML; MG/100ML; MG/100ML
INJECTION, SOLUTION INTRAVENOUS CONTINUOUS
Status: DISCONTINUED | OUTPATIENT
Start: 2025-05-21 | End: 2025-05-21

## 2025-05-21 RX ORDER — MORPHINE SULFATE 4 MG/ML
4 INJECTION, SOLUTION INTRAMUSCULAR; INTRAVENOUS EVERY 10 MIN PRN
Status: DISCONTINUED | OUTPATIENT
Start: 2025-05-21 | End: 2025-05-21

## 2025-05-21 RX ORDER — ACETAMINOPHEN 500 MG
1000 TABLET ORAL ONCE
Status: COMPLETED | OUTPATIENT
Start: 2025-05-21 | End: 2025-05-21

## 2025-05-21 RX ORDER — NALOXONE HYDROCHLORIDE 0.4 MG/ML
0.08 INJECTION, SOLUTION INTRAMUSCULAR; INTRAVENOUS; SUBCUTANEOUS AS NEEDED
Status: DISCONTINUED | OUTPATIENT
Start: 2025-05-21 | End: 2025-05-21

## 2025-05-21 RX ORDER — DEXTROSE MONOHYDRATE 50 MG/ML
INJECTION, SOLUTION INTRAVENOUS CONTINUOUS
Status: DISCONTINUED | OUTPATIENT
Start: 2025-05-21 | End: 2025-05-21

## 2025-05-21 RX ORDER — LIDOCAINE HYDROCHLORIDE 10 MG/ML
INJECTION, SOLUTION EPIDURAL; INFILTRATION; INTRACAUDAL; PERINEURAL AS NEEDED
Status: DISCONTINUED | OUTPATIENT
Start: 2025-05-21 | End: 2025-05-21 | Stop reason: SURG

## 2025-05-21 RX ORDER — VANCOMYCIN HYDROCHLORIDE 1 G/20ML
INJECTION, POWDER, LYOPHILIZED, FOR SOLUTION INTRAVENOUS AS NEEDED
Status: DISCONTINUED | OUTPATIENT
Start: 2025-05-21 | End: 2025-05-21 | Stop reason: HOSPADM

## 2025-05-21 RX ORDER — HYDROMORPHONE HYDROCHLORIDE 1 MG/ML
0.4 INJECTION, SOLUTION INTRAMUSCULAR; INTRAVENOUS; SUBCUTANEOUS EVERY 5 MIN PRN
Status: DISCONTINUED | OUTPATIENT
Start: 2025-05-21 | End: 2025-05-21

## 2025-05-21 RX ORDER — MIDAZOLAM HYDROCHLORIDE 1 MG/ML
INJECTION INTRAMUSCULAR; INTRAVENOUS AS NEEDED
Status: DISCONTINUED | OUTPATIENT
Start: 2025-05-21 | End: 2025-05-21 | Stop reason: SURG

## 2025-05-21 RX ORDER — DEXTROSE MONOHYDRATE 25 G/50ML
50 INJECTION, SOLUTION INTRAVENOUS
Status: DISCONTINUED | OUTPATIENT
Start: 2025-05-21 | End: 2025-05-21

## 2025-05-21 RX ORDER — MORPHINE SULFATE 4 MG/ML
2 INJECTION, SOLUTION INTRAMUSCULAR; INTRAVENOUS EVERY 10 MIN PRN
Status: DISCONTINUED | OUTPATIENT
Start: 2025-05-21 | End: 2025-05-21

## 2025-05-21 RX ORDER — TOBRAMYCIN 40 MG/ML
INJECTION INTRAMUSCULAR; INTRAVENOUS AS NEEDED
Status: DISCONTINUED | OUTPATIENT
Start: 2025-05-21 | End: 2025-05-21 | Stop reason: HOSPADM

## 2025-05-21 RX ORDER — INSULIN ASPART 100 [IU]/ML
INJECTION, SOLUTION INTRAVENOUS; SUBCUTANEOUS ONCE
Status: COMPLETED | OUTPATIENT
Start: 2025-05-21 | End: 2025-05-21

## 2025-05-21 RX ORDER — ACETAMINOPHEN 650 MG
TABLET, EXTENDED RELEASE ORAL AS NEEDED
Status: DISCONTINUED | OUTPATIENT
Start: 2025-05-21 | End: 2025-05-21 | Stop reason: HOSPADM

## 2025-05-21 RX ORDER — MORPHINE SULFATE 10 MG/ML
6 INJECTION, SOLUTION INTRAMUSCULAR; INTRAVENOUS EVERY 10 MIN PRN
Status: DISCONTINUED | OUTPATIENT
Start: 2025-05-21 | End: 2025-05-21

## 2025-05-21 RX ORDER — NICOTINE POLACRILEX 4 MG
15 LOZENGE BUCCAL
Status: DISCONTINUED | OUTPATIENT
Start: 2025-05-21 | End: 2025-05-21

## 2025-05-21 RX ORDER — HYDROMORPHONE HYDROCHLORIDE 1 MG/ML
0.2 INJECTION, SOLUTION INTRAMUSCULAR; INTRAVENOUS; SUBCUTANEOUS EVERY 5 MIN PRN
Status: DISCONTINUED | OUTPATIENT
Start: 2025-05-21 | End: 2025-05-21

## 2025-05-21 RX ADMIN — SODIUM CHLORIDE, SODIUM LACTATE, POTASSIUM CHLORIDE, CALCIUM CHLORIDE: 600; 310; 30; 20 INJECTION, SOLUTION INTRAVENOUS at 07:58:00

## 2025-05-21 RX ADMIN — LIDOCAINE HYDROCHLORIDE 50 MG: 10 INJECTION, SOLUTION EPIDURAL; INFILTRATION; INTRACAUDAL; PERINEURAL at 08:03:00

## 2025-05-21 RX ADMIN — SODIUM CHLORIDE, SODIUM LACTATE, POTASSIUM CHLORIDE, CALCIUM CHLORIDE: 600; 310; 30; 20 INJECTION, SOLUTION INTRAVENOUS at 08:58:00

## 2025-05-21 RX ADMIN — SODIUM CHLORIDE, SODIUM LACTATE, POTASSIUM CHLORIDE, CALCIUM CHLORIDE: 600; 310; 30; 20 INJECTION, SOLUTION INTRAVENOUS at 09:28:00

## 2025-05-21 RX ADMIN — MIDAZOLAM HYDROCHLORIDE 2 MG: 1 INJECTION INTRAMUSCULAR; INTRAVENOUS at 07:59:00

## 2025-05-21 NOTE — PLAN OF CARE
Pt arrived to Post-op SDS department. Arrived via cart by OR transport. Pt assessment completed. Encouraged to drink and eat and void. All belongings at bedside.     Postop sign and held orders reviewed, released and followed.     Pt in no acute distress. All questions and needs acknowledged and addressed to satisfaction. Pt given call light and aware to call with needs.

## 2025-05-21 NOTE — PLAN OF CARE
Per policy- Discharge from PACU to a Nursing Unit, PACU_2.05    Mayte Post Anesthesia Recovery Score per PACU staff= 8    Patients are discharged from the PACU or recovery area when they have achieved a score of 8 or greater on the Mayte Post Anesthesia Recovery Score for Discharge to an inpatient room or Pre-op Recovery. Patients may be discharged to CCU/PCCU with scores <8.    ACTIVITY  Able to move 4 extremities voluntarily or on command=2  Able to move 2 extremities voluntarily or on command=1  Unable to move extremities voluntarily or on command=0    RESPIRATION  Able to breathe deeply and cough freely=2  Dyspneic, limited breathing or tachypnea=1  Apneic or on mechanical ventilator=0    CIRCULATION  BP +/- 20% of preanesthetic level=2  BP +/- 20-49 % of preanesthetic level=1  BP +/- 51% of preanesthetic level=0    CONSCIOUSNESS  Fully Awake=2  Arousable on calling=1  Not responding=0    Oxygen Saturation  Able to maintain baseline room air=2  Need 02 inhalation to maintain 02 Saturation >90%=1  02 Saturation <90% even with 02 supplement=0    PAIN=0  Pain is at a level of 4 or below or at 50% of the incoming pain rating.      Report from from Ashok DELUNA RN  \"Augustina\"

## 2025-05-21 NOTE — ANESTHESIA PREPROCEDURE EVALUATION
Anesthesia PreOp Note    HPI:     Chandler Lancaster is a 44 year old male who presents for preoperative consultation requested by: Phoebe Guillaume DPM    Date of Surgery: 5/21/2025    Procedure(s):  Removal of all necrotic tissue and bone right foot  Indication: Osteomyelitis    Relevant Problems   No relevant active problems       NPO:  Last Liquid Consumption Date: 05/20/25  Last Liquid Consumption Time: 2200  Last Solid Consumption Date: 05/20/25  Last Solid Consumption Time: 2200  Last Liquid Consumption Date: 05/20/25          History Review:  Patient Active Problem List    Diagnosis Date Noted    Cellulitis of scalp 04/05/2024    Acute renal failure 04/05/2024    Hypermetropia of both eyes 09/12/2018    Type 2 diabetes mellitus without retinopathy (HCC) 09/12/2018    Diabetic ketoacidosis without coma associated with type 2 diabetes mellitus (HCC) 08/14/2018       Past Medical History[1]    Past Surgical History[2]    Prescriptions Prior to Admission[3]  Current Medications and Prescriptions Ordered in Epic[4]    Allergies[5]    Family History[6]  Social Hx on file[7]    Available pre-op labs reviewed.  Lab Results   Component Value Date    WBC 12.3 (H) 04/23/2025    RBC 4.03 (L) 04/23/2025    HGB 11.0 (L) 04/23/2025    HCT 32.2 (L) 04/23/2025    MCV 79.9 (L) 04/23/2025    MCH 27.3 04/23/2025    MCHC 34.2 04/23/2025    RDW 12.4 04/23/2025    .0 04/23/2025     Lab Results   Component Value Date     (L) 04/23/2025    K 3.4 (L) 04/23/2025    CL 88 (L) 04/23/2025    CO2 27.0 04/23/2025    BUN 9 04/23/2025    CREATSERUM 1.12 04/23/2025     (HH) 04/23/2025    PGLU 340 (H) 05/21/2025    CA 8.5 (L) 04/23/2025          Vital Signs:  Body mass index is 27.5 kg/m².   height is 1.905 m (6' 3\") and weight is 99.8 kg (220 lb). His oral temperature is 99.2 °F (37.3 °C). His blood pressure is 164/98 (abnormal) and his pulse is 99. His respiration is 16.   Vitals:    05/19/25 1630 05/21/25 0636  05/21/25 0659   BP:   (!) 164/98   Pulse:  100 99   Resp:  18 16   Temp:  99 °F (37.2 °C) 99.2 °F (37.3 °C)   TempSrc:  Oral Oral   Weight: 99.8 kg (220 lb)  99.8 kg (220 lb)   Height: 1.905 m (6' 3\")  1.905 m (6' 3\")        Anesthesia Evaluation     Patient summary reviewed and Nursing notes reviewed    No history of anesthetic complications   Airway   Mallampati: I  TM distance: >3 FB  Neck ROM: full  Dental      Pulmonary - negative ROS and normal exam   Cardiovascular - normal exam    ROS comment: 1. Left ventricle: The cavity size was normal. Wall thickness was      normal. Systolic function was normal. The estimated ejection      fraction was 60%, by biplane method of disks. Wall motion was      normal; there were no regional wall motion abnormalities.      Doppler parameters are consistent with abnormal left ventricular      relaxation (grade 1 diastolic dysfunction).   No previous study was available for comparison.       Neuro/Psych - negative ROS     GI/Hepatic/Renal      Endo/Other    (+) diabetes mellitus  Abdominal                  Anesthesia Plan:   ASA:  3  Plan:   MAC  Informed Consent Plan and Risks Discussed With:  Patient      I have informed Chandler Lancaster and/or legal guardian or family member of the nature of the anesthetic plan, benefits, risks including possible dental damage if relevant, major complications, and any alternative forms of anesthetic management.   All of the patient's questions were answered to the best of my ability. The patient desires the anesthetic management as planned.  Tori Elizabeth MD  5/21/2025 7:16 AM  Present on Admission:  **None**           [1]   Past Medical History:   Diabetes (HCC)    Neuropathy   [2]   Past Surgical History:  Procedure Laterality Date    Oral surgery     [3]   Medications Prior to Admission   Medication Sig Dispense Refill Last Dose/Taking    insulin detemir 100 UNIT/ML Subcutaneous Solution Pen-injector Inject 10 Units into the skin  nightly. 6 mL 1 5/19/2025    Blood Glucose Monitoring Suppl (FREESTYLE LITE) Does not apply Device   0 5/20/2025    Glucose Blood (FREESTYLE LITE TEST) In Vitro Strip   3 5/20/2025    FREESTYLE LANCETS Does not apply Misc    5/20/2025    BD PEN NEEDLE SHORT U/F 31G X 8 MM Does not apply Misc    5/20/2025   [4]   Current Facility-Administered Medications Ordered in Epic   Medication Dose Route Frequency Provider Last Rate Last Admin    lactated ringers infusion   Intravenous Continuous Phoebe Guillaume DPM 20 mL/hr at 05/21/25 0658 New Bag at 05/21/25 0658    ceFAZolin (Ancef) 2g in 10mL IV syringe premix  2 g Intravenous Once Phoebe Guillaume DPREGI        dextrose 5% infusion   Intravenous Continuous Tori Elizabeth MD         No current [facility]-ordered outpatient medications on file.   [5] No Known Allergies  [6]   Family History  Problem Relation Age of Onset    No Known Problems Father     Diabetes Mother     Diabetes Sister     Glaucoma Neg     Macular degeneration Neg    [7]   Social History  Socioeconomic History    Marital status: Single   Tobacco Use    Smoking status: Never    Smokeless tobacco: Never   Vaping Use    Vaping status: Never Used   Substance and Sexual Activity    Alcohol use: No    Drug use: No

## 2025-05-21 NOTE — PLAN OF CARE
Per policy- Post-Operative Care and Discharge Criteria, PAT_Prep_Rec_4.1    The patient is provided with written instructions, which the Pre-Op/Recovery Unit nurse reviews with the patient and/or significant other person responsible for the patient at home.    Patients who receive other than local anesthesia are accompanied at discharge by a designated adult who is responsible, or assumes responsibility for the patient.    Pt meets Discharge Criteria  Fully awake and oriented.  Takes oral fluids.  No persistent nausea or vomiting.  Ambulate, when applicable.  No excessive pain or bleeding present.  Vital signs stable or within 20% of pre-operative status.  Voiding if applicable  IV discontinued, site clean and dry and intact.  *Nursing personnel are allowed to discharge patient, based on the above approved discharge criteria.    Pt changed into clothes by self.    Asked if there were any needs/questions at this time.   All needs/questions addressed to Pt satisfaction.  No further issues noted at this time.    Randi DELUNA RN  \"Augustina\"

## 2025-05-21 NOTE — DISCHARGE INSTRUCTIONS
Patient is to walk in CAM Boot to right foot   Do not remove dressing   Elevated right foot   Take medications as prescribed

## 2025-05-21 NOTE — ANESTHESIA POSTPROCEDURE EVALUATION
Patient: Chandler Lancaster    Procedure Summary       Date: 05/21/25 Room / Location: [facility] MAIN OR 08 / [facility] MA[facility]    Anesthesia Start: 0758 Anesthesia Stop:     Procedure: Removal of all necrotic tissue and bone right foot (Right: Foot) Diagnosis: (Osteomyelitis)    Surgeons: Phoebe Guillaume DPM Anesthesiologist: Tori Elizabeth MD    Anesthesia Type: MAC ASA Status: 3            Anesthesia Type: MAC    Vitals Value Taken Time   /79 05/21/25 09:28   Temp 98 °F (36.7 °C) 05/21/25 09:28   Pulse 97 05/21/25 09:28   Resp 12 05/21/25 09:28   SpO2 98 % 05/21/25 09:28       [facility] AN Post Evaluation:   Patient Evaluated in PACU  Patient Participation: complete - patient participated  Level of Consciousness: sleepy but conscious  Pain Management: adequate  Airway Patency:patent  Yes    Nausea/Vomiting: none  Cardiovascular Status: acceptable  Respiratory Status: acceptable and room air  Postoperative Hydration acceptable      Randi Eason CRNA  5/21/2025 9:28 AM

## 2025-05-22 NOTE — OPERATIVE REPORT
[facility]  part of formerly [facility]    Operative Note         Chandler Lancaster Location: OR   [facility] [id number] MRN [id number]   Admission Date 5/21/2025 Operation Date 5/21/2025   Attending Physician No att. providers found       Patient Name: Chandler Lancaster     Preoperative Diagnosis: Osteomyelitis     Postoperative Diagnosis: Osteomyelitis     Procedure(s):  Removal of all necrotic tissue and bone of the right foot   First Ray resection and application of Antibiotic beads     Primary Surgeon: Phoebe Guillaume DPM          Anesthesia: MAC     Specimen:   ID Type Source Tests Collected by Time Destination   1 : 1. right first toe Tissue Toe SURGICAL PATHOLOGY TISSUE Phoebe Guillaume DPM 5/21/2025 0836    2 : 3. right first toe clearance Tissue Toe SURGICAL PATHOLOGY TISSUE Phoebe Guillaume DPM 5/21/2025 0851    A : 2. right foot tissue Tissue Foot, right ANAEROBIC CULTURE, TISSUE AEROBIC CULTURE, AFB CULTURE AND SMEAR, FUNGUS CULTURE AND SMEAR(INCLUDES STAIN) Phoebe Guillaume DPM 5/21/2025 0850         Estimated Blood Loss: Blood Output: 280 mL (5/21/2025  8:25 AM)  Complications: None     Indications for procedure: The patient is a 44-year-old male with poorly controlled diabetes with an infection to the right foot. The patient has not been taking any medications or seeking medical advise for his condition. The patient has multiple draining ulcerations of his right foot. The patient now has exposed bone and osteomyelitis in the first ray. The patient has been on oral antibiotics as an outpatient and has failed to respond to these and presents today for surgical intervention.    Surgical Findings: Consistent with Diagnosis      Complexity: Moderate (optional)    Operative Summary: After an IV was started by the Department of Anesthesia, the patient was taken back to the operating room and placed on the operative table in the supine position. A restraint belt was placed around the  patient's waist using copious amounts of Webril and an ankle pneumatic tourniquet was placed around the patient's right ankle and the patient was made comfortable by the Department of Anesthesia. After adequate amounts of sedation had been given to the patient, an ankle block of 20 cc's of a 50/50 mix of 1% Lidocaine plain and 0.5% Marcaine plain was injected into the right ankle. The foot was then prepped and draped in the normal sterile orthopedic manner. The foot was elevated and an Esmarch bandage applied to exsanguinate the foot. The tourniquet was then inflated to 250 mmHg and the foot was brought back onto the table. Using Band-Aid scissors, the stockinet was cut and reflected revealing the right foot.    Attention was placed to the right foot where two ulceration were noted medial to the first ray. The ulcerations were malodorous, with necrotic bases and purulent drainage. Using a #10 blade an incision was made medial from the first MPJ to the base of the first metatarsal down to the level of bone. Using a ronguer and #15 blade the foot was debrided of necrotic nonviable infected tissue. The tissue was taken and placed in specimen cups. Net the incision was further deepened to expose the first metatarsal, upon inspection the bone was noted to be soft, discolored and easy to penetrate with a hemostat. The bone was clinically noted to be infected and using a sagittal saw it was resected to the level of one third of the base of the metatarsal. The bone was removed and placed in a specimen cup. Using 500mL of betadine infused pulsed lavage the incision was copiously irrigated. The base of the first metatarsal was resected an a portion was removed and sent as a clearance fragment. The incision was further inspected and further debrided of any nonviable tissue. Care was taken to to protect and not sever the neurovascular bundle, all bleeders were identified and hemostasis was obtained with cautery.     Antibiotic  beads were created and applied to the incision and using 3-0 vicryl the deep space of the incision was closed using 3-0 nylon simple retention sutures were used to close the incision.  The incision was dressed with betadine soaked adaptic, 4 x 4 gauze, abdominal pads, kerlix, coban, and an ACE bandage to provide compression. The tourniquet was deflated and a rodgers hyperemic response was noted to the remaining digits.     The patient tolerated the procedure well and was escorted to the Postanesthesia Care Unit with vital signs stable and vascular status intact, as was evidenced by capillary bleeding, which was present during the procedure. The patient was instructed to follow up with Dr. Guillaume post operatively.     Implants:   Implant Name Type Inv. Item Serial No.  Lot No. LRB No. Used Action   GRAFT BNE SUB PSTE 10CC BEAD 25CC W/ MIX BWL - SN/A  GRAFT BNE SUB PSTE 10CC BEAD 25CC W/ MIX BWL N/A [employer] [facility] [id number] Right 1 Implanted        Drains: None     Condition: Stable       Phoebe Guillaume DPM

## (undated) DEVICE — SKIN PREP TRAY 4 COMPARTM TRAY: Brand: MEDLINE INDUSTRIES, INC.

## (undated) DEVICE — BANDAGE,GAUZE,4.5"X4.1YD,STERILE,LF: Brand: MEDLINE

## (undated) DEVICE — 12 ML SYRINGE LUER-LOCK TIP: Brand: MONOJECT

## (undated) DEVICE — SYRINGE MED 20ML STD CLR PLAS LL TIP N CTRL

## (undated) DEVICE — YANKAUER,FLEXIBLE HANDLE,REGLR CAPACITY: Brand: MEDLINE INDUSTRIES, INC.

## (undated) DEVICE — GLOVE SUR 8 SENSICARE PI PIP GRN PWD F

## (undated) DEVICE — SPONGE LAP 18X18IN WHT COT 4 PLY FLD STRUNG

## (undated) DEVICE — SUT COAT VCRL 0 27IN CP-1 ABSRB UD 36MM 1/2

## (undated) DEVICE — DISPOSABLE TOURNIQUET CUFF SINGLE BLADDER, DUAL PORT AND QUICK CONNECT CONNECTOR: Brand: COLOR CUFF

## (undated) DEVICE — SUT ETHLN 3-0 30IN FS-1 NABSRB BLK 24MM 3/8 C

## (undated) DEVICE — PACK CDS LOWER EXTREMITY

## (undated) DEVICE — HANDLE SUR BLU PLAS LT FLX SLIP ON ST DISP

## (undated) DEVICE — PAD,ABDOMINAL,8"X7.5",STERILE,LF,1/PK: Brand: MEDLINE

## (undated) DEVICE — INTENDED FOR TISSUE SEPARATION, AND OTHER PROCEDURES THAT REQUIRE A SHARP SURGICAL BLADE TO PUNCTURE OR CUT.: Brand: BARD-PARKER ® STAINLESS STEEL BLADES

## (undated) DEVICE — SYRINGE MED 10ML LL TIP W/O SFTY DISP

## (undated) DEVICE — SOLUTION IRRIG 1000ML 0.9% NACL USP BTL

## (undated) DEVICE — PRECISION (7.0 X 0.51 X 18.5MM)

## (undated) DEVICE — CONTAINER,SPECIMEN,OR STERILE,4OZ: Brand: MEDLINE

## (undated) DEVICE — GLOVE SUR 7.5 SENSICARE PIP WHT PWD F

## (undated) DEVICE — HANDPIECE SET WITH HIGH FLOW TIP AND SUCTION TUBE: Brand: INTERPULSE

## (undated) DEVICE — ZZ-CONVERTED-TO-522442- SPONGE 4X4 10PK

## (undated) DEVICE — PRECISION OFFSET (9.0 X 0.51 X 25.0MM)

## (undated) NOTE — ED AVS SNAPSHOT
Sonia Jackson   MRN: [id number]    Department:  [facility] Emergency Department   Date of Visit:  9/8/2019           Disclosure     Insurance plans vary and the physician(s) referred by the ER may not be covered by your plan.  Please contac CARE PHYSICIAN AT ONCE OR RETURN IMMEDIATELY TO THE EMERGENCY DEPARTMENT. If you have been prescribed any medication(s), please fill your prescription right away and begin taking the medication(s) as directed.   If you believe that any of the medications

## (undated) NOTE — ED AVS SNAPSHOT
Sukh Melgar   MRN: [id number]    Department:  [facility] Emergency Department   Date of Visit:  11/25/2018           Disclosure     Insurance plans vary and the physician(s) referred by the ER may not be covered by your plan.  Please cont CARE PHYSICIAN AT ONCE OR RETURN IMMEDIATELY TO THE EMERGENCY DEPARTMENT. If you have been prescribed any medication(s), please fill your prescription right away and begin taking the medication(s) as directed.   If you believe that any of the medications

## (undated) NOTE — LETTER
September 12, 2018    Clara Sebastian MD  [id number] [facility]     Patient: Hussain Sims   YOB: 1980   Date of Visit: 9/12/2018       Dear Dr. Karey Bennett MD:    Thank you for referring Shaneka Jimenez to me for evaluatio mouth 2 (two) times daily with meals.  Disp: 60 tablet Rfl: 2   glimepiride 4 MG Oral Tab Take 0.5 tablets (2 mg total) by mouth every morning before breakfast. Disp: 30 tablet Rfl: 2       Allergies:  No Known Allergies    ROS:       PHYSICAL EXAM:     Bas Recommend +2.25 over the counter glasses for distance while his blood sugars are stabilizing. Will see patient back in 1 year, unless he needs to come back sooner for a refraction only when his blood sugars have stabilized.      Type 2 diabetes mellitus